# Patient Record
Sex: MALE | Race: WHITE | NOT HISPANIC OR LATINO | Employment: FULL TIME | URBAN - METROPOLITAN AREA
[De-identification: names, ages, dates, MRNs, and addresses within clinical notes are randomized per-mention and may not be internally consistent; named-entity substitution may affect disease eponyms.]

---
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
## 2018-05-29 ENCOUNTER — OFFICE VISIT (OUTPATIENT)
Dept: INTERNAL MEDICINE | Facility: CLINIC | Age: 18
End: 2018-05-29
Payer: COMMERCIAL

## 2018-05-29 ENCOUNTER — LAB VISIT (OUTPATIENT)
Dept: LAB | Facility: HOSPITAL | Age: 18
End: 2018-05-29
Payer: COMMERCIAL

## 2018-05-29 VITALS
TEMPERATURE: 98 F | BODY MASS INDEX: 20.83 KG/M2 | HEIGHT: 70 IN | WEIGHT: 145.5 LBS | SYSTOLIC BLOOD PRESSURE: 118 MMHG | OXYGEN SATURATION: 97 % | HEART RATE: 93 BPM | DIASTOLIC BLOOD PRESSURE: 68 MMHG

## 2018-05-29 DIAGNOSIS — Z11.3 SCREEN FOR STD (SEXUALLY TRANSMITTED DISEASE): ICD-10-CM

## 2018-05-29 DIAGNOSIS — Z11.3 SCREEN FOR STD (SEXUALLY TRANSMITTED DISEASE): Primary | ICD-10-CM

## 2018-05-29 PROCEDURE — 86592 SYPHILIS TEST NON-TREP QUAL: CPT

## 2018-05-29 PROCEDURE — 86703 HIV-1/HIV-2 1 RESULT ANTBDY: CPT

## 2018-05-29 PROCEDURE — 3008F BODY MASS INDEX DOCD: CPT | Mod: CPTII,S$GLB,, | Performed by: NURSE PRACTITIONER

## 2018-05-29 PROCEDURE — 87491 CHLMYD TRACH DNA AMP PROBE: CPT

## 2018-05-29 PROCEDURE — 86694 HERPES SIMPLEX NES ANTBDY: CPT

## 2018-05-29 PROCEDURE — 99213 OFFICE O/P EST LOW 20 MIN: CPT | Mod: S$GLB,,, | Performed by: NURSE PRACTITIONER

## 2018-05-29 PROCEDURE — 80074 ACUTE HEPATITIS PANEL: CPT

## 2018-05-29 PROCEDURE — 36415 COLL VENOUS BLD VENIPUNCTURE: CPT

## 2018-05-29 PROCEDURE — 99999 PR PBB SHADOW E&M-EST. PATIENT-LVL III: CPT | Mod: PBBFAC,,, | Performed by: NURSE PRACTITIONER

## 2018-05-29 NOTE — PROGRESS NOTES
"Subjective:       Patient ID: Fili Dejesus is a 18 y.o. male.    Chief Complaint: Exposure to STD (Check)    HPI;  17 yo male that presents to clinic today for STD screen.    Patient denies any acute symptoms or complaints.  States that he just wants to "be safe and get checked."  Denies any pain with urination, discharge or any lumps or bums in genital region.  States that he does wear condoms when engaging in sexual activity.  He denies any exposure to someone with known STD.    Review of Systems   Constitutional: Negative for activity change, appetite change, fatigue and fever.   Respiratory: Negative for apnea, cough, shortness of breath and wheezing.    Cardiovascular: Negative for chest pain, palpitations and leg swelling.   Gastrointestinal: Negative for abdominal distention, abdominal pain, constipation, diarrhea, nausea and vomiting.   Genitourinary: Negative for difficulty urinating, discharge, dysuria, frequency, genital sores, hematuria, penile pain, penile swelling, scrotal swelling, testicular pain and urgency.   Musculoskeletal: Negative for arthralgias, myalgias, neck pain and neck stiffness.   Skin: Negative for color change and rash.   Neurological: Negative for dizziness, light-headedness, numbness and headaches.   Psychiatric/Behavioral: Negative for behavioral problems.       Objective:      Physical Exam   Constitutional: He is oriented to person, place, and time. He appears well-developed and well-nourished. No distress.   Neurological: He is alert and oriented to person, place, and time. No cranial nerve deficit or sensory deficit.   Skin: Skin is warm and dry. No erythema.   Psychiatric: His behavior is normal.       Assessment:       1. Screen for STD (sexually transmitted disease)        Plan:       1. Screen for STD (sexually transmitted disease)    -Vitals are stable.  -No acute symptoms or complaints.  -Wears protection when engaging in sexual activity.  -Will test for HIV, herpes, " syphilis, Hep ABC and gonorrhea and chlamydia.

## 2018-05-30 LAB
C TRACH DNA SPEC QL NAA+PROBE: NOT DETECTED
HAV IGM SERPL QL IA: NEGATIVE
HBV CORE IGM SERPL QL IA: NEGATIVE
HBV SURFACE AG SERPL QL IA: NEGATIVE
HCV AB SERPL QL IA: NEGATIVE
HIV 1+2 AB+HIV1 P24 AG SERPL QL IA: NEGATIVE
HSV AB, IGM BY EIA: NEGATIVE
N GONORRHOEA DNA SPEC QL NAA+PROBE: NOT DETECTED
RPR SER QL: NORMAL

## 2020-06-19 ENCOUNTER — PATIENT MESSAGE (OUTPATIENT)
Dept: INTERNAL MEDICINE | Facility: CLINIC | Age: 20
End: 2020-06-19

## 2020-06-21 ENCOUNTER — HOSPITAL ENCOUNTER (EMERGENCY)
Facility: HOSPITAL | Age: 20
Discharge: HOME | End: 2020-06-21
Attending: EMERGENCY MEDICINE
Payer: COMMERCIAL

## 2020-06-21 VITALS
SYSTOLIC BLOOD PRESSURE: 123 MMHG | DIASTOLIC BLOOD PRESSURE: 71 MMHG | RESPIRATION RATE: 20 BRPM | OXYGEN SATURATION: 98 % | BODY MASS INDEX: 23.41 KG/M2 | TEMPERATURE: 98 F | WEIGHT: 167.25 LBS | HEIGHT: 71 IN | HEART RATE: 79 BPM

## 2020-06-21 DIAGNOSIS — Z20.822 EXPOSURE TO COVID-19 VIRUS: Primary | ICD-10-CM

## 2020-06-21 LAB — SARS-COV-2 RDRP RESP QL NAA+PROBE: NEGATIVE

## 2020-06-21 PROCEDURE — U0002 COVID-19 LAB TEST NON-CDC: HCPCS

## 2020-06-21 PROCEDURE — 99283 EMERGENCY DEPT VISIT LOW MDM: CPT

## 2020-06-21 NOTE — ED PROVIDER NOTES
"   History      Chief Complaint   Patient presents with    COVID-19 Concerns     Pt states, "I work at [employer] and I need to get tested for Covid-19."       Review of patient's allergies indicates:  No Known Allergies     HPI   HPI    6/21/2020, 2:07 PM   History obtained from the patient      History of Present Illness: Fili Dejesus is a 20 y.o. male patient who presents to the Emergency Department for covid 19 swab.  No symptoms but several close positive contacts.  Symptoms are moderate in severity.     No further complaints or concerns at this time.           PCP: Primary Doctor No       Past Medical History:  No past medical history on file.      Past Surgical History:  Past Surgical History:   Procedure Laterality Date    ADENOIDECTOMY      FRACTURE SURGERY      left arm ORIF    HERNIA REPAIR      inguinal repair at 10 months of age    TONSILLECTOMY             Family History:  Family History   Problem Relation Age of Onset    No Known Problems Mother     No Known Problems Father            Social History:  Social History     Tobacco Use    Smoking status: Light Tobacco Smoker    Smokeless tobacco: Never Used   Substance and Sexual Activity    Alcohol use: Yes     Comment: socially    Drug use: No    Sexual activity: Yes     Partners: Female     Birth control/protection: Condom       ROS     Review of Systems   Constitutional: Negative for chills and fever.   HENT: Negative for facial swelling and trouble swallowing.    Eyes: Negative for pain and discharge.   Respiratory: Negative for chest tightness and shortness of breath.    Cardiovascular: Negative for palpitations and leg swelling.   Gastrointestinal: Negative for diarrhea and vomiting.   Endocrine: Negative for polydipsia and polyuria.   Genitourinary: Negative for decreased urine volume and flank pain.   Musculoskeletal: Negative for joint swelling and neck stiffness.   Skin: Negative for rash and wound.   Neurological: Negative for " "syncope and light-headedness.   All other systems reviewed and are negative.      Physical Exam      Initial Vitals [06/21/20 1250]   BP Pulse Resp Temp SpO2   123/71 79 20 97.9 °F (36.6 °C) 98 %      MAP       --         Physical Exam  Vital signs and nursing notes reviewed.  Constitutional: Patient is in NAD. Awake and alert. Well-developed and well-nourished.  Head: Atraumatic. Normocephalic.  Eyes: PERRL. EOM intact. Conjunctivae nl. No scleral icterus.  ENT: Mucous membranes are moist. Oropharynx is clear.  Neck: Supple. No JVD. No lymphadenopathy.  No meningismus  Cardiovascular: Regular rate and rhythm. No murmurs, rubs, or gallops. Distal pulses are 2+ and symmetric.  Pulmonary/Chest: No respiratory distress. Clear to auscultation bilaterally. No wheezing, rales, or rhonchi.  Abdominal: Soft. Non-distended. No TTP. No rebound, guarding, or rigidity. Good bowel sounds.  Genitourinary: No CVA tenderness  Musculoskeletal: Moves all extremities. No edema.   Skin: Warm and dry.  Neurological: Awake and alert. No acute focal neurological deficits are appreciated.  Psychiatric: Normal affect. Good eye contact. Appropriate in content.      ED Course          Procedures  ED Vital Signs:  Vitals:    06/21/20 1250   BP: 123/71   Pulse: 79   Resp: 20   Temp: 97.9 °F (36.6 °C)   TempSrc: Oral   SpO2: 98%   Weight: 75.8 kg (167 lb 3.5 oz)   Height: 5' 11" (1.803 m)         Results for orders placed or performed during the hospital encounter of 06/21/20   COVID-19 Rapid Screening   Result Value Ref Range    SARS-CoV-2 RNA, Amplification, Qual Negative Negative             Imaging Results:  Imaging Results    None            The Emergency Provider reviewed the vital signs and test results, which are outlined above.    ED Discussion             Medication(s) given in the ER:  Medications - No data to display        Follow-up Information     [facility]    Specialty: Emergency Medicine  Why: If symptoms " worsen  Contact information:  [address] [facility] [address] [phone]                        Medication List      You have not been prescribed any medications.             Medical Decision Making      GAVE COVID 19 PRECAUTIONS.  RETURN TO ER IF DIFFICULTY BREATHING, UNABLE TO KEEP DOWN FLUIDS, ETC    All findings were reviewed with the patient/family in detail.   All remaining questions and concerns were addressed at that time.  Patient/family has been counseled regarding the need for follow-up as well as the indication to return to the emergency room should new or worrisome developments occur.        MDM               Clinical Impression:        ICD-10-CM ICD-9-CM   1. Exposure to Covid-19 Virus  Z20.828              Glo Frank PA-C  06/21/20 1403

## 2020-06-28 ENCOUNTER — CLINICAL SUPPORT (OUTPATIENT)
Dept: URGENT CARE | Facility: CLINIC | Age: 20
End: 2020-06-28
Payer: COMMERCIAL

## 2020-06-28 VITALS — RESPIRATION RATE: 16 BRPM | HEART RATE: 76 BPM | OXYGEN SATURATION: 98 % | TEMPERATURE: 99 F

## 2020-06-28 DIAGNOSIS — Z03.818 ENCOUNTER FOR OBSERVATION FOR SUSPECTED EXPOSURE TO OTHER BIOLOGICAL AGENTS RULED OUT: ICD-10-CM

## 2020-06-28 PROCEDURE — 99211 PR OFFICE/OUTPT VISIT, EST, LEVL I: ICD-10-PCS | Mod: S$GLB,,, | Performed by: NURSE PRACTITIONER

## 2020-06-28 PROCEDURE — 99211 OFF/OP EST MAY X REQ PHY/QHP: CPT | Mod: S$GLB,,, | Performed by: NURSE PRACTITIONER

## 2020-06-28 PROCEDURE — U0003 INFECTIOUS AGENT DETECTION BY NUCLEIC ACID (DNA OR RNA); SEVERE ACUTE RESPIRATORY SYNDROME CORONAVIRUS 2 (SARS-COV-2) (CORONAVIRUS DISEASE [COVID-19]), AMPLIFIED PROBE TECHNIQUE, MAKING USE OF HIGH THROUGHPUT TECHNOLOGIES AS DESCRIBED BY CMS-2020-01-R: HCPCS

## 2020-07-02 LAB — SARS-COV-2 RNA RESP QL NAA+PROBE: NOT DETECTED

## 2020-07-03 ENCOUNTER — TELEPHONE (OUTPATIENT)
Dept: URGENT CARE | Facility: CLINIC | Age: 20
End: 2020-07-03

## 2020-12-30 ENCOUNTER — OFFICE VISIT (OUTPATIENT)
Dept: URGENT CARE | Facility: CLINIC | Age: 20
End: 2020-12-30
Payer: COMMERCIAL

## 2020-12-30 VITALS
DIASTOLIC BLOOD PRESSURE: 78 MMHG | HEART RATE: 72 BPM | TEMPERATURE: 99 F | BODY MASS INDEX: 23.62 KG/M2 | RESPIRATION RATE: 17 BRPM | OXYGEN SATURATION: 98 % | SYSTOLIC BLOOD PRESSURE: 124 MMHG | HEIGHT: 70 IN | WEIGHT: 165 LBS

## 2020-12-30 DIAGNOSIS — U07.1 COVID-19 VIRUS DETECTED: ICD-10-CM

## 2020-12-30 DIAGNOSIS — U07.1 COVID-19 VIRUS INFECTION: Primary | ICD-10-CM

## 2020-12-30 LAB
CTP QC/QA: YES
SARS-COV-2 RDRP RESP QL NAA+PROBE: POSITIVE

## 2020-12-30 PROCEDURE — U0002 COVID-19 LAB TEST NON-CDC: HCPCS | Mod: QW,S$GLB,, | Performed by: STUDENT IN AN ORGANIZED HEALTH CARE EDUCATION/TRAINING PROGRAM

## 2020-12-30 PROCEDURE — U0002: ICD-10-PCS | Mod: QW,S$GLB,, | Performed by: STUDENT IN AN ORGANIZED HEALTH CARE EDUCATION/TRAINING PROGRAM

## 2020-12-30 PROCEDURE — 99212 OFFICE O/P EST SF 10 MIN: CPT | Mod: S$GLB,,, | Performed by: STUDENT IN AN ORGANIZED HEALTH CARE EDUCATION/TRAINING PROGRAM

## 2020-12-30 PROCEDURE — 99212 PR OFFICE/OUTPT VISIT, EST, LEVL II, 10-19 MIN: ICD-10-PCS | Mod: S$GLB,,, | Performed by: STUDENT IN AN ORGANIZED HEALTH CARE EDUCATION/TRAINING PROGRAM

## 2020-12-30 PROCEDURE — 3008F BODY MASS INDEX DOCD: CPT | Mod: CPTII,S$GLB,, | Performed by: STUDENT IN AN ORGANIZED HEALTH CARE EDUCATION/TRAINING PROGRAM

## 2020-12-30 PROCEDURE — 3008F PR BODY MASS INDEX (BMI) DOCUMENTED: ICD-10-PCS | Mod: CPTII,S$GLB,, | Performed by: STUDENT IN AN ORGANIZED HEALTH CARE EDUCATION/TRAINING PROGRAM

## 2020-12-30 NOTE — PROGRESS NOTES
"Subjective:       Patient ID: Fili Dejesus is a 20 y.o. male.    Vitals:  height is 5' 10" (1.778 m) and weight is 74.8 kg (165 lb). His temperature is 98.7 °F (37.1 °C). His blood pressure is 124/78 and his pulse is 72. His respiration is 17 and oxygen saturation is 98%.     Chief Complaint: COVID-19 Concerns    Pt states he have a slight cough and body aches that started yesterday.     Other  This is a new problem. The current episode started yesterday. The problem occurs constantly. The problem has been unchanged. Associated symptoms include coughing. Pertinent negatives include no chills, congestion, diaphoresis, fatigue, fever, myalgias, nausea, rash, sore throat or vomiting. Nothing aggravates the symptoms.   No known COVID exposure.    Constitution: Negative for chills, sweating, fatigue and fever.   HENT: Negative for ear pain, congestion, sinus pain, sinus pressure, sore throat and voice change.    Neck: Negative for painful lymph nodes.   Eyes: Negative for eye redness.   Respiratory: Positive for cough. Negative for chest tightness, sputum production, bloody sputum, COPD, shortness of breath, stridor, wheezing and asthma.    Gastrointestinal: Negative for nausea and vomiting.   Musculoskeletal: Negative for muscle ache.   Skin: Negative for rash.   Allergic/Immunologic: Negative for seasonal allergies and asthma.   Hematologic/Lymphatic: Negative for swollen lymph nodes.       Objective:      Physical Exam   Constitutional: He is oriented to person, place, and time.  Non-toxic appearance. He does not appear ill. No distress.   HENT:   Head: Normocephalic.   Eyes: Conjunctivae are normal.   Pulmonary/Chest: Effort normal. No respiratory distress.   Abdominal: Normal appearance.   Neurological: He is alert and oriented to person, place, and time. Coordination and gait normal.   Skin: Skin is not diaphoretic. Psychiatric: His behavior is normal. Mood and thought content normal.   Nursing note and vitals " reviewed.        Assessment:       1. Cough        Plan:         Cough  -     POCT COVID-19 Rapid Screening        Telephone interview conducted in urgent care today. Exam denotes features of patient observation only.   Vitals stable. No evidence of respiratory distress noted, able to speak in complete sentences without pause.    Requesting COVID-19 testing given symptoms.   Tested for COVID-19 today. Rapid test was Positive. Educated on COVID-19 and COVID-19 testing. Advised on COVID-19 precautions. Discussed supportive care and OTC meds for symptom relief. Advised on return/follow-up precautions. Advised on ER precautions. Answered all patient questions. Patient verbalized understanding and voiced agreement with current treatment plan.

## 2021-10-01 ENCOUNTER — LAB VISIT (OUTPATIENT)
Dept: LAB | Facility: HOSPITAL | Age: 21
End: 2021-10-01
Payer: COMMERCIAL

## 2021-10-01 ENCOUNTER — IMMUNIZATION (OUTPATIENT)
Dept: INTERNAL MEDICINE | Facility: CLINIC | Age: 21
End: 2021-10-01
Payer: COMMERCIAL

## 2021-10-01 ENCOUNTER — OFFICE VISIT (OUTPATIENT)
Dept: INTERNAL MEDICINE | Facility: CLINIC | Age: 21
End: 2021-10-01
Payer: COMMERCIAL

## 2021-10-01 VITALS
BODY MASS INDEX: 21.62 KG/M2 | HEART RATE: 75 BPM | WEIGHT: 159.63 LBS | OXYGEN SATURATION: 98 % | DIASTOLIC BLOOD PRESSURE: 70 MMHG | SYSTOLIC BLOOD PRESSURE: 126 MMHG | HEIGHT: 72 IN

## 2021-10-01 DIAGNOSIS — Z11.3 SCREEN FOR STD (SEXUALLY TRANSMITTED DISEASE): ICD-10-CM

## 2021-10-01 DIAGNOSIS — Z00.00 VISIT FOR ANNUAL HEALTH EXAMINATION: Primary | ICD-10-CM

## 2021-10-01 DIAGNOSIS — Z23 NEED FOR VACCINATION: ICD-10-CM

## 2021-10-01 DIAGNOSIS — Z00.00 VISIT FOR ANNUAL HEALTH EXAMINATION: ICD-10-CM

## 2021-10-01 LAB
ALBUMIN SERPL BCP-MCNC: 4.5 G/DL (ref 3.5–5.2)
ALP SERPL-CCNC: 64 U/L (ref 55–135)
ALT SERPL W/O P-5'-P-CCNC: 19 U/L (ref 10–44)
ANION GAP SERPL CALC-SCNC: 13 MMOL/L (ref 8–16)
AST SERPL-CCNC: 19 U/L (ref 10–40)
BASOPHILS # BLD AUTO: 0.03 K/UL (ref 0–0.2)
BASOPHILS NFR BLD: 0.7 % (ref 0–1.9)
BILIRUB SERPL-MCNC: 0.8 MG/DL (ref 0.1–1)
BUN SERPL-MCNC: 16 MG/DL (ref 6–20)
CALCIUM SERPL-MCNC: 9.8 MG/DL (ref 8.7–10.5)
CHLORIDE SERPL-SCNC: 105 MMOL/L (ref 95–110)
CO2 SERPL-SCNC: 22 MMOL/L (ref 23–29)
CREAT SERPL-MCNC: 1.1 MG/DL (ref 0.5–1.4)
DIFFERENTIAL METHOD: ABNORMAL
EOSINOPHIL # BLD AUTO: 0 K/UL (ref 0–0.5)
EOSINOPHIL NFR BLD: 0.9 % (ref 0–8)
ERYTHROCYTE [DISTWIDTH] IN BLOOD BY AUTOMATED COUNT: 12.7 % (ref 11.5–14.5)
EST. GFR  (AFRICAN AMERICAN): >60 ML/MIN/1.73 M^2
EST. GFR  (NON AFRICAN AMERICAN): >60 ML/MIN/1.73 M^2
GLUCOSE SERPL-MCNC: 85 MG/DL (ref 70–110)
HCT VFR BLD AUTO: 44.5 % (ref 40–54)
HGB BLD-MCNC: 15.2 G/DL (ref 14–18)
IMM GRANULOCYTES # BLD AUTO: 0.01 K/UL (ref 0–0.04)
IMM GRANULOCYTES NFR BLD AUTO: 0.2 % (ref 0–0.5)
LYMPHOCYTES # BLD AUTO: 1.3 K/UL (ref 1–4.8)
LYMPHOCYTES NFR BLD: 29.2 % (ref 18–48)
MCH RBC QN AUTO: 32.1 PG (ref 27–31)
MCHC RBC AUTO-ENTMCNC: 34.2 G/DL (ref 32–36)
MCV RBC AUTO: 94 FL (ref 82–98)
MONOCYTES # BLD AUTO: 0.4 K/UL (ref 0.3–1)
MONOCYTES NFR BLD: 10.2 % (ref 4–15)
NEUTROPHILS # BLD AUTO: 2.5 K/UL (ref 1.8–7.7)
NEUTROPHILS NFR BLD: 58.8 % (ref 38–73)
NRBC BLD-RTO: 0 /100 WBC
PLATELET # BLD AUTO: 268 K/UL (ref 150–450)
PMV BLD AUTO: 10.9 FL (ref 9.2–12.9)
POTASSIUM SERPL-SCNC: 4.6 MMOL/L (ref 3.5–5.1)
PROT SERPL-MCNC: 7.5 G/DL (ref 6–8.4)
RBC # BLD AUTO: 4.73 M/UL (ref 4.6–6.2)
SODIUM SERPL-SCNC: 140 MMOL/L (ref 136–145)
WBC # BLD AUTO: 4.32 K/UL (ref 3.9–12.7)

## 2021-10-01 PROCEDURE — 87591 N.GONORRHOEAE DNA AMP PROB: CPT | Performed by: INTERNAL MEDICINE

## 2021-10-01 PROCEDURE — 80074 ACUTE HEPATITIS PANEL: CPT | Performed by: INTERNAL MEDICINE

## 2021-10-01 PROCEDURE — 99385 PR PREVENTIVE VISIT,NEW,18-39: ICD-10-PCS | Mod: 25,S$GLB,, | Performed by: INTERNAL MEDICINE

## 2021-10-01 PROCEDURE — 90686 IIV4 VACC NO PRSV 0.5 ML IM: CPT | Mod: S$GLB,,, | Performed by: INTERNAL MEDICINE

## 2021-10-01 PROCEDURE — 99999 PR PBB SHADOW E&M-EST. PATIENT-LVL III: CPT | Mod: PBBFAC,,, | Performed by: INTERNAL MEDICINE

## 2021-10-01 PROCEDURE — 1160F RVW MEDS BY RX/DR IN RCRD: CPT | Mod: CPTII,S$GLB,, | Performed by: INTERNAL MEDICINE

## 2021-10-01 PROCEDURE — 3078F DIAST BP <80 MM HG: CPT | Mod: CPTII,S$GLB,, | Performed by: INTERNAL MEDICINE

## 2021-10-01 PROCEDURE — 3008F PR BODY MASS INDEX (BMI) DOCUMENTED: ICD-10-PCS | Mod: CPTII,S$GLB,, | Performed by: INTERNAL MEDICINE

## 2021-10-01 PROCEDURE — 3074F SYST BP LT 130 MM HG: CPT | Mod: CPTII,S$GLB,, | Performed by: INTERNAL MEDICINE

## 2021-10-01 PROCEDURE — 90471 IMMUNIZATION ADMIN: CPT | Mod: S$GLB,,, | Performed by: INTERNAL MEDICINE

## 2021-10-01 PROCEDURE — 80053 COMPREHEN METABOLIC PANEL: CPT | Performed by: INTERNAL MEDICINE

## 2021-10-01 PROCEDURE — 1160F PR REVIEW ALL MEDS BY PRESCRIBER/CLIN PHARMACIST DOCUMENTED: ICD-10-PCS | Mod: CPTII,S$GLB,, | Performed by: INTERNAL MEDICINE

## 2021-10-01 PROCEDURE — 1159F MED LIST DOCD IN RCRD: CPT | Mod: CPTII,S$GLB,, | Performed by: INTERNAL MEDICINE

## 2021-10-01 PROCEDURE — 3008F BODY MASS INDEX DOCD: CPT | Mod: CPTII,S$GLB,, | Performed by: INTERNAL MEDICINE

## 2021-10-01 PROCEDURE — 99999 PR PBB SHADOW E&M-EST. PATIENT-LVL III: ICD-10-PCS | Mod: PBBFAC,,, | Performed by: INTERNAL MEDICINE

## 2021-10-01 PROCEDURE — 86592 SYPHILIS TEST NON-TREP QUAL: CPT | Performed by: INTERNAL MEDICINE

## 2021-10-01 PROCEDURE — 90686 FLU VACCINE (QUAD) GREATER THAN OR EQUAL TO 3YO PRESERVATIVE FREE IM: ICD-10-PCS | Mod: S$GLB,,, | Performed by: INTERNAL MEDICINE

## 2021-10-01 PROCEDURE — 87491 CHLMYD TRACH DNA AMP PROBE: CPT | Performed by: INTERNAL MEDICINE

## 2021-10-01 PROCEDURE — 1159F PR MEDICATION LIST DOCUMENTED IN MEDICAL RECORD: ICD-10-PCS | Mod: CPTII,S$GLB,, | Performed by: INTERNAL MEDICINE

## 2021-10-01 PROCEDURE — 90471 FLU VACCINE (QUAD) GREATER THAN OR EQUAL TO 3YO PRESERVATIVE FREE IM: ICD-10-PCS | Mod: S$GLB,,, | Performed by: INTERNAL MEDICINE

## 2021-10-01 PROCEDURE — 99385 PREV VISIT NEW AGE 18-39: CPT | Mod: 25,S$GLB,, | Performed by: INTERNAL MEDICINE

## 2021-10-01 PROCEDURE — 36415 COLL VENOUS BLD VENIPUNCTURE: CPT | Performed by: INTERNAL MEDICINE

## 2021-10-01 PROCEDURE — 3078F PR MOST RECENT DIASTOLIC BLOOD PRESSURE < 80 MM HG: ICD-10-PCS | Mod: CPTII,S$GLB,, | Performed by: INTERNAL MEDICINE

## 2021-10-01 PROCEDURE — 85025 COMPLETE CBC W/AUTO DIFF WBC: CPT | Performed by: INTERNAL MEDICINE

## 2021-10-01 PROCEDURE — 87389 HIV-1 AG W/HIV-1&-2 AB AG IA: CPT | Performed by: INTERNAL MEDICINE

## 2021-10-01 PROCEDURE — 3074F PR MOST RECENT SYSTOLIC BLOOD PRESSURE < 130 MM HG: ICD-10-PCS | Mod: CPTII,S$GLB,, | Performed by: INTERNAL MEDICINE

## 2021-10-02 LAB
C TRACH DNA SPEC QL NAA+PROBE: NOT DETECTED
N GONORRHOEA DNA SPEC QL NAA+PROBE: NOT DETECTED
RPR SER QL: NORMAL

## 2021-10-04 LAB
HAV IGM SERPL QL IA: NEGATIVE
HBV CORE IGM SERPL QL IA: NEGATIVE
HBV SURFACE AG SERPL QL IA: NEGATIVE
HCV AB SERPL QL IA: NEGATIVE
HIV 1+2 AB+HIV1 P24 AG SERPL QL IA: NEGATIVE

## 2022-03-29 ENCOUNTER — PATIENT MESSAGE (OUTPATIENT)
Dept: RESEARCH | Facility: HOSPITAL | Age: 22
End: 2022-03-29
Payer: COMMERCIAL

## 2022-04-12 ENCOUNTER — TELEPHONE (OUTPATIENT)
Dept: INTERNAL MEDICINE | Facility: CLINIC | Age: 22
End: 2022-04-12
Payer: COMMERCIAL

## 2022-04-12 NOTE — TELEPHONE ENCOUNTER
Called pt, left voice message replying to his appointment request with Dr Thomas for as soon as possible due  To being sick. Stated in message that Dr Thomas has no available appointments but to please call us back to make an urgent care visit asap for this issue. Left office call back number

## 2022-08-03 ENCOUNTER — OCCUPATIONAL HEALTH (OUTPATIENT)
Dept: URGENT CARE | Facility: CLINIC | Age: 22
End: 2022-08-03

## 2022-08-03 DIAGNOSIS — Z02.1 PRE-EMPLOYMENT EXAMINATION: Primary | ICD-10-CM

## 2022-08-03 PROCEDURE — 99499 PHYSICAL, BASIC COMPLEXITY: ICD-10-PCS | Mod: S$GLB,,, | Performed by: EMERGENCY MEDICINE

## 2022-08-03 PROCEDURE — 99499 UNLISTED E&M SERVICE: CPT | Mod: S$GLB,,, | Performed by: EMERGENCY MEDICINE

## 2023-01-17 ENCOUNTER — OFFICE VISIT (OUTPATIENT)
Dept: INTERNAL MEDICINE | Facility: CLINIC | Age: 23
End: 2023-01-17
Payer: COMMERCIAL

## 2023-01-17 VITALS
BODY MASS INDEX: 23.43 KG/M2 | SYSTOLIC BLOOD PRESSURE: 124 MMHG | DIASTOLIC BLOOD PRESSURE: 78 MMHG | HEART RATE: 67 BPM | WEIGHT: 173 LBS | HEIGHT: 72 IN | TEMPERATURE: 98 F | OXYGEN SATURATION: 98 %

## 2023-01-17 DIAGNOSIS — J02.9 SORE THROAT: Primary | ICD-10-CM

## 2023-01-17 LAB
CTP QC/QA: YES
MOLECULAR STREP A: NEGATIVE

## 2023-01-17 PROCEDURE — 99213 OFFICE O/P EST LOW 20 MIN: CPT | Mod: S$GLB,,, | Performed by: FAMILY MEDICINE

## 2023-01-17 PROCEDURE — 3074F SYST BP LT 130 MM HG: CPT | Mod: CPTII,S$GLB,, | Performed by: FAMILY MEDICINE

## 2023-01-17 PROCEDURE — 3008F PR BODY MASS INDEX (BMI) DOCUMENTED: ICD-10-PCS | Mod: CPTII,S$GLB,, | Performed by: FAMILY MEDICINE

## 2023-01-17 PROCEDURE — 1159F PR MEDICATION LIST DOCUMENTED IN MEDICAL RECORD: ICD-10-PCS | Mod: CPTII,S$GLB,, | Performed by: FAMILY MEDICINE

## 2023-01-17 PROCEDURE — 87651 POCT STREP A MOLECULAR: ICD-10-PCS | Mod: QW,S$GLB,, | Performed by: FAMILY MEDICINE

## 2023-01-17 PROCEDURE — 87651 STREP A DNA AMP PROBE: CPT | Mod: QW,S$GLB,, | Performed by: FAMILY MEDICINE

## 2023-01-17 PROCEDURE — 99999 PR PBB SHADOW E&M-EST. PATIENT-LVL III: ICD-10-PCS | Mod: PBBFAC,,, | Performed by: FAMILY MEDICINE

## 2023-01-17 PROCEDURE — 3008F BODY MASS INDEX DOCD: CPT | Mod: CPTII,S$GLB,, | Performed by: FAMILY MEDICINE

## 2023-01-17 PROCEDURE — 99999 PR PBB SHADOW E&M-EST. PATIENT-LVL III: CPT | Mod: PBBFAC,,, | Performed by: FAMILY MEDICINE

## 2023-01-17 PROCEDURE — 99213 PR OFFICE/OUTPT VISIT, EST, LEVL III, 20-29 MIN: ICD-10-PCS | Mod: S$GLB,,, | Performed by: FAMILY MEDICINE

## 2023-01-17 PROCEDURE — 3078F DIAST BP <80 MM HG: CPT | Mod: CPTII,S$GLB,, | Performed by: FAMILY MEDICINE

## 2023-01-17 PROCEDURE — 1159F MED LIST DOCD IN RCRD: CPT | Mod: CPTII,S$GLB,, | Performed by: FAMILY MEDICINE

## 2023-01-17 PROCEDURE — 3078F PR MOST RECENT DIASTOLIC BLOOD PRESSURE < 80 MM HG: ICD-10-PCS | Mod: CPTII,S$GLB,, | Performed by: FAMILY MEDICINE

## 2023-01-17 PROCEDURE — 3074F PR MOST RECENT SYSTOLIC BLOOD PRESSURE < 130 MM HG: ICD-10-PCS | Mod: CPTII,S$GLB,, | Performed by: FAMILY MEDICINE

## 2023-01-17 NOTE — PROGRESS NOTES
Subjective:       Patient ID: Fili Dejesus is a 22 y.o. male. PCP Sheri Thomas MD     Chief Complaint: Sore Throat    Patient is here for  visit for sore throat x 1 day. NO other complaints, otherwise feels well  No kids in house  We discussed that we'll do a strep test r/o strep.  If positive jenkins end in abx to his dena deric  If negative, then viral URI and not Rx needed,. OTC prn and hydration  Colds and most upper respiratory infections are viruses that do not respond to antibiotics, and the main treatment is symptomatic care, e.g., Robitussin DM OTC for cough, lots of fluids and rest, etc.    He says he understands. All questions answered      Review of patient's allergies indicates:  No Known Allergies    Social History     Tobacco Use    Smoking status: Some Days    Smokeless tobacco: Never   Substance Use Topics    Alcohol use: Yes     Comment: socially    Drug use: Yes     Types: Marijuana     Comment: few times weekly        Family History   Problem Relation Age of Onset    No Known Problems Mother     No Known Problems Father     Prostate cancer Paternal Grandfather        Past Surgical History:   Procedure Laterality Date    ADENOIDECTOMY      FRACTURE SURGERY      left arm ORIF    HERNIA REPAIR      inguinal repair at 10 months of age    TONSILLECTOMY         There is no problem list on file for this patient.      No current outpatient medications on file prior to visit.     No current facility-administered medications on file prior to visit.           Review of Systems   Constitutional:  Negative for chills and fever.   HENT:  Negative for ear pain.    Eyes:  Negative for pain.   Respiratory:  Negative for chest tightness.    Cardiovascular:  Negative for chest pain.   Gastrointestinal:  Negative for abdominal pain.   Genitourinary:  Negative for flank pain.   Musculoskeletal:  Negative for gait problem.   Neurological:  Negative for syncope.   Psychiatric/Behavioral:  Negative for  behavioral problems.      Objective:    /78 (BP Location: Left arm, Patient Position: Sitting, BP Method: Medium (Manual))   Pulse 67   Temp 98.1 °F (36.7 °C)   Ht 6' (1.829 m)   Wt 78.5 kg (173 lb)   SpO2 98%   BMI 23.46 kg/m²     Physical Exam  Constitutional:       Appearance: He is well-developed.   HENT:      Head: Normocephalic and atraumatic.      Mouth/Throat:      Mouth: Mucous membranes are moist.      Pharynx: Oropharynx is clear. No oropharyngeal exudate or posterior oropharyngeal erythema.   Cardiovascular:      Rate and Rhythm: Normal rate.      Heart sounds: Normal heart sounds.   Pulmonary:      Effort: No respiratory distress.      Breath sounds: Normal breath sounds. No wheezing or rales.   Abdominal:      Palpations: Abdomen is soft.   Musculoskeletal:      Cervical back: Neck supple.   Skin:     General: Skin is dry.   Neurological:      Mental Status: He is alert.   Psychiatric:         Behavior: Behavior normal.       Assessment:       1. Sore throat        Plan:       Fili was seen today for sore throat.    Diagnoses and all orders for this visit:    Sore throat  -     POCT Strep A, Molecular  If positive jenkins end in abx to his dena deric - Amoxil TID x 10d  If negative, then viral URI and not Rx needed,. OTC prn and hydration  Colds and most upper respiratory infections are viruses that do not respond to antibiotics, and the main treatment is symptomatic care, e.g., Robitussin DM OTC for cough, lots of fluids and rest, etc.    Cc: PCP

## 2023-07-11 ENCOUNTER — CLINICAL SUPPORT (OUTPATIENT)
Dept: OTHER | Facility: CLINIC | Age: 23
End: 2023-07-11
Payer: COMMERCIAL

## 2023-07-11 DIAGNOSIS — Z00.8 ENCOUNTER FOR OTHER GENERAL EXAMINATION: ICD-10-CM

## 2023-07-12 VITALS
DIASTOLIC BLOOD PRESSURE: 78 MMHG | WEIGHT: 171 LBS | HEIGHT: 71 IN | BODY MASS INDEX: 23.94 KG/M2 | SYSTOLIC BLOOD PRESSURE: 114 MMHG

## 2023-07-12 LAB
HDLC SERPL-MCNC: 55 MG/DL
POC CHOLESTEROL, LDL (DOCK): 100 MG/DL
POC CHOLESTEROL, TOTAL: 173 MG/DL
POC GLUCOSE, FASTING: 83 MG/DL (ref 60–110)
TRIGL SERPL-MCNC: 102 MG/DL

## 2023-11-10 ENCOUNTER — OCCUPATIONAL HEALTH (OUTPATIENT)
Dept: URGENT CARE | Facility: CLINIC | Age: 23
End: 2023-11-10
Payer: COMMERCIAL

## 2023-11-10 DIAGNOSIS — Z00.00 ENCOUNTER FOR PHYSICAL EXAMINATION: Primary | ICD-10-CM

## 2023-11-10 PROCEDURE — 99499 UNLISTED E&M SERVICE: CPT | Mod: S$GLB,,, | Performed by: EMERGENCY MEDICINE

## 2023-11-10 PROCEDURE — 99499 DOT PHYSICAL: ICD-10-PCS | Mod: S$GLB,,, | Performed by: EMERGENCY MEDICINE

## 2024-01-03 ENCOUNTER — OFFICE VISIT (OUTPATIENT)
Dept: INTERNAL MEDICINE | Facility: CLINIC | Age: 24
End: 2024-01-03
Payer: COMMERCIAL

## 2024-01-03 VITALS
BODY MASS INDEX: 25.74 KG/M2 | DIASTOLIC BLOOD PRESSURE: 72 MMHG | SYSTOLIC BLOOD PRESSURE: 118 MMHG | WEIGHT: 183.88 LBS | HEIGHT: 71 IN | OXYGEN SATURATION: 98 % | HEART RATE: 105 BPM

## 2024-01-03 DIAGNOSIS — J10.1 INFLUENZA A: Primary | ICD-10-CM

## 2024-01-03 DIAGNOSIS — Z23 NEED FOR VACCINATION: ICD-10-CM

## 2024-01-03 DIAGNOSIS — J02.9 SORE THROAT: ICD-10-CM

## 2024-01-03 PROCEDURE — 3078F DIAST BP <80 MM HG: CPT | Mod: CPTII,S$GLB,, | Performed by: INTERNAL MEDICINE

## 2024-01-03 PROCEDURE — 99214 OFFICE O/P EST MOD 30 MIN: CPT | Mod: S$GLB,,, | Performed by: INTERNAL MEDICINE

## 2024-01-03 PROCEDURE — 1159F MED LIST DOCD IN RCRD: CPT | Mod: CPTII,S$GLB,, | Performed by: INTERNAL MEDICINE

## 2024-01-03 PROCEDURE — 3008F BODY MASS INDEX DOCD: CPT | Mod: CPTII,S$GLB,, | Performed by: INTERNAL MEDICINE

## 2024-01-03 PROCEDURE — 3074F SYST BP LT 130 MM HG: CPT | Mod: CPTII,S$GLB,, | Performed by: INTERNAL MEDICINE

## 2024-01-03 PROCEDURE — 99999 PR PBB SHADOW E&M-EST. PATIENT-LVL III: CPT | Mod: PBBFAC,,, | Performed by: INTERNAL MEDICINE

## 2024-01-03 RX ORDER — DEXAMETHASONE 4 MG/1
4 TABLET ORAL EVERY 12 HOURS
Qty: 6 TABLET | Refills: 0 | Status: SHIPPED | OUTPATIENT
Start: 2024-01-03 | End: 2024-01-06

## 2024-01-03 RX ORDER — OSELTAMIVIR PHOSPHATE 75 MG/1
75 CAPSULE ORAL 2 TIMES DAILY
Qty: 10 CAPSULE | Refills: 0 | Status: SHIPPED | OUTPATIENT
Start: 2024-01-03 | End: 2024-01-08

## 2024-01-03 RX ORDER — TETANUS TOXOID, REDUCED DIPHTHERIA TOXOID AND ACELLULAR PERTUSSIS VACCINE, ADSORBED 5; 2.5; 8; 8; 2.5 [IU]/.5ML; [IU]/.5ML; UG/.5ML; UG/.5ML; UG/.5ML
0.5 SUSPENSION INTRAMUSCULAR ONCE
Qty: 0.5 ML | Refills: 0 | Status: SHIPPED | OUTPATIENT
Start: 2024-01-15 | End: 2024-01-15

## 2024-01-03 NOTE — PROGRESS NOTES
INTERNAL MEDICINE CLINIC  Follow-up Visit Progress Note     PRESENTING HISTORY     PCP: Panfilo Brar MD  Current Chief Complaint/Problem:    Chief Complaint   Patient presents with    Sore Throat     Also have headache and chest pains     History of Present Illness & ROS: Mr. Fili Dejesus is a 23 y.o. male.    Started with sore throat last night.    Flew back from [place] on Monday.    No chill.    Pleuritic chest pain with mucous.    Frontal headache.    Took cough syrup OTC and lozenges.    PAST HISTORY:     Past Medical History:   Diagnosis Date    History of COVID-19 2020       Past Surgical History:   Procedure Laterality Date    ADENOIDECTOMY      FRACTURE SURGERY      left arm ORIF Age 12    HERNIA REPAIR      inguinal repair at 10 months of age    TONSILLECTOMY         Family History   Problem Relation Age of Onset    No Known Problems Mother     No Known Problems Father     Prostate cancer Paternal Grandfather        Social History     Socioeconomic History    Marital status: Single   Occupational History     Comment: student at    Tobacco Use    Smoking status: Never    Smokeless tobacco: Never   Substance and Sexual Activity    Alcohol use: Yes     Comment: socially    Drug use: Yes     Types: Marijuana     Comment: few times weekly     Sexual activity: Yes     Partners: Female     Birth control/protection: Condom   Social History Narrative    He works in [employer].    Single        Goes to gym - weightlifting, cardio       MEDICATIONS & ALLERGIES:     No current outpatient medications on file prior to visit.     No current facility-administered medications on file prior to visit.        Review of patient's allergies indicates:  No Known Allergies    Medications Reconciliation:   I have reconciled the patient's home medications and discharge medications with the patient/family. I have updated all changes.  Refer to After-Visit Medication List.    OBJECTIVE:     Vital Signs:  Vitals:     01/03/24 1019   BP: 118/72   Pulse: 105     Wt Readings from Last 3 Encounters:   01/03/24 1019 83.4 kg (183 lb 13.8 oz)   07/11/23 0826 77.6 kg (171 lb)   01/17/23 0933 78.5 kg (173 lb)     Body mass index is 25.64 kg/m².     98% RA. Temp 100.5F    Physical Exam:  General: Well developed, well nourished. No distress.  HEENT: Head is normocephalic, atraumatic; ears are normal.   Pharynx slightly erythematous.   Eyes: Clear conjunctiva.  Neck: Supple, symmetrical neck; trachea midline.  Lungs: Clear to auscultation bilaterally and normal respiratory effort. Slight decrease in breath sounds.  Mildly tender on palpation anterior ribs near sternum.  Cardiovascular: Heart with regular rhythm.  Slightly tachy.  Extremities: No LE edema. Pulses 2+ and symmetric.   Abdomen: Abdomen is soft, non-tender non-distended with normal bowel sounds.  Skin: Skin color, texture, turgor normal. No rashes.  Musculoskeletal: Normal gait.   Lymph Nodes: No cervical or supraclavicular adenopathy.  Neurologic: Normal strength and tone. No focal numbness or weakness.   Psychiatric: Normal affect. Alert.      Laboratory  Lab Results   Component Value Date    WBC 4.32 10/01/2021    HGB 15.2 10/01/2021    HCT 44.5 10/01/2021     10/01/2021    CHOL 175 08/07/2017    TRIG 117 08/07/2017    HDL 76 (H) 08/07/2017    ALT 19 10/01/2021    AST 19 10/01/2021     10/01/2021    K 4.6 10/01/2021     10/01/2021    CREATININE 1.1 10/01/2021    BUN 16 10/01/2021    CO2 22 (L) 10/01/2021       ASSESSMENT & PLAN:     Influenza A  Sore throat  -     POCT COVID-19 Rapid Screening: negative  -     POCT Strep A, Molecular: negative  -     POCT Influenza A/B Molecular: positive    Plan:  -     oseltamivir (TAMIFLU) 75 MG capsule; Take 1 capsule (75 mg total) by mouth 2 (two) times daily. for 5 days  Dispense: 10 capsule; Refill: 0  -     dexAMETHasone (DECADRON) 4 MG Tab; Take 1 tablet (4 mg total) by mouth every 12 (twelve) hours. for 3 days   Dispense: 6 tablet; Refill: 0    Need for vaccination  -     pneumoc 20-rani conj-dip cr,PF, (PREVNAR-20, PF,) 0.5 mL Syrg injection; Inject 0.5 mLs into the muscle once. for 1 dose  Dispense: 0.5 mL; Refill: 0  -     diphth,pertus,acell,,tetanus (BOOSTRIX TDAP) 2.5-8-5 Lf-mcg-Lf/0.5mL Syrg injection; Inject 0.5 mLs into the muscle once. for 1 dose  Dispense: 0.5 mL; Refill: 0    Preventive Health Maintenance:    Tdap and Prevnar 20 - in 2 weeks  Flu - in 2 weeks.    Return to Clinic for Follow Up with me:   3 months for annual    Scheduled Follow-up :  No future appointments.      After Visit Medication List :     Medication List            Accurate as of January 3, 2024 10:47 AM. If you have any questions, ask your nurse or doctor.                START taking these medications      BOOSTRIX TDAP 2.5-8-5 Lf-mcg-Lf/0.5mL Syrg injection  Generic drug: diphth,pertus(acell),tetanus  Inject 0.5 mLs into the muscle once. for 1 dose  Start taking on: January 15, 2024  Started by: Panfilo Brar MD     dexAMETHasone 4 MG Tab  Commonly known as: DECADRON  Take 1 tablet (4 mg total) by mouth every 12 (twelve) hours. for 3 days  Started by: Panfilo Brar MD     oseltamivir 75 MG capsule  Commonly known as: TAMIFLU  Take 1 capsule (75 mg total) by mouth 2 (two) times daily. for 5 days  Started by: Panfilo Brar MD     pneumoc 20-rani conj-dip cr(PF) 0.5 mL Syrg injection  Commonly known as: PREVNAR-20 (PF)  Inject 0.5 mLs into the muscle once. for 1 dose  Start taking on: January 15, 2024  Started by: Panfilo Brar MD               Where to Get Your Medications        These medications were sent to [id number] - [place], [place] - [facility] [facility] AT [facility] & [address], [place] [address]      Phone: [phone]   BOOSTRIX TDAP 2.5-8-5 Lf-mcg-Lf/0.5mL Syrg injection  dexAMETHasone 4 MG Tab  oseltamivir 75 MG capsule  pneumoc 20-rani conj-dip cr(PF) 0.5 mL  Syrg injection         Signing Physician:  Panfilo rBar MD

## 2024-04-26 NOTE — PROGRESS NOTES
INTERNAL MEDICINE CLINIC  Follow-up Visit Progress Note     PRESENTING HISTORY     PCP: Panfilo Brar MD    Last Clinic Visit with me: 1-3-2024    Current Chief Complaint/Problem:    Chief Complaint   Patient presents with    Annual Exam      History of Present Illness & ROS: Mr. Fili Dejesus is a 24 y.o. male.    Review of Systems:  Review of Systems   Constitutional:  Negative for chills and fever.   Cardiovascular:  Negative for chest pain.   Gastrointestinal:  Negative for abdominal pain.   Genitourinary:  Negative for dysuria.   Musculoskeletal:  Negative for joint pain.   Skin:  Negative for rash.   Neurological:  Negative for dizziness and headaches.   Psychiatric/Behavioral:  Negative for depression.        PAST HISTORY:     Past Medical History:   Diagnosis Date    History of COVID-19 2020       Past Surgical History:   Procedure Laterality Date    ADENOIDECTOMY      FRACTURE SURGERY      left arm ORIF Age 12    HERNIA REPAIR      inguinal repair at 10 months of age    TONSILLECTOMY         Family History   Problem Relation Name Age of Onset    No Known Problems Mother      No Known Problems Father      Prostate cancer Paternal Grandfather         Social History     Socioeconomic History    Marital status: Single   Occupational History     Comment: student at    Tobacco Use    Smoking status: Never    Smokeless tobacco: Never   Substance and Sexual Activity    Alcohol use: Yes     Comment: socially    Drug use: Yes     Types: Marijuana     Comment: few times weekly     Sexual activity: Yes     Partners: Female     Birth control/protection: Condom   Social History Narrative    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     No current facility-administered medications on file prior to visit.        Review of patient's allergies indicates:  No Known  Allergies    Medications Reconciliation:   I have reconciled the patient's home medications and discharge medications with the patient/family. I have updated all changes.  Refer to After-Visit Medication List.    OBJECTIVE:     Vital Signs:  Vitals:    04/27/24 0836   BP: 120/70   Pulse: 92     Wt Readings from Last 3 Encounters:   04/27/24 0836 83.8 kg (184 lb 11.9 oz)   01/03/24 1019 83.4 kg (183 lb 13.8 oz)   07/11/23 0826 77.6 kg (171 lb)     Body mass index is 25.77 kg/m².     Physical Exam:  General: Well developed, well nourished. No distress.  HEENT: Head is normocephalic, atraumatic; ears are normal.    Eyes: Clear conjunctiva.  Neck: Supple, symmetrical neck; trachea midline.  Lungs: Clear to auscultation bilaterally and normal respiratory effort.  Cardiovascular: Heart with regular rate and rhythm.    Extremities: No LE edema.    Abdomen: Abdomen is soft, non-tender non-distended with normal bowel sounds.  Musculoskeletal: Normal gait.   Genital:  Normal penis.    No rash in the genital area.  Scrotum and epididymis normal. No inguinal hernia.  No inguinal nodes.   Rectal: No perianal lesions or rash. Digital exam: deferred.   Lymph Nodes: No cervical, supraclavicular or axillary adenopathy.  Psychiatric: Normal affect. Alert.      Laboratory  Lab Results   Component Value Date    WBC 4.32 10/01/2021    HGB 15.2 10/01/2021    HCT 44.5 10/01/2021     10/01/2021    CHOL 175 08/07/2017    TRIG 117 08/07/2017    HDL 76 (H) 08/07/2017    ALT 19 10/01/2021    AST 19 10/01/2021     10/01/2021    K 4.6 10/01/2021     10/01/2021    CREATININE 1.1 10/01/2021    BUN 16 10/01/2021    CO2 22 (L) 10/01/2021       ASSESSMENT & PLAN:     Annual physical exam  -     Urinalysis, Reflex to Urine Culture Urine, Clean Catch  -     Lipid Panel; Future; Expected date: 04/27/2024  -     CBC Auto Differential; Future; Expected date: 04/27/2024  -     Comprehensive Metabolic Panel; Future; Expected date:  04/27/2024  -     TSH; Future; Expected date: 04/27/2024  -     Hemoglobin A1C; Future; Expected date: 04/27/2024    Screening for STD (sexually transmitted disease)  -     C. trachomatis/N. gonorrhoeae by AMP DNA  -     HIV 1/2 Ag/Ab (4th Gen); Future; Expected date: 04/27/2024  -     HERPES SIMPLEX 1&2 IGG; Future; Expected date: 04/27/2024  -     Treponema Pallidium Antibodies IgG, IgM; Future; Expected date: 04/27/2024  -     HEPATITIS B SURFACE ANTIBODY; Future; Expected date: 04/27/2024    Tinea versicolor  -     ketoconazole (NIZORAL) 2 % shampoo; Apply topically twice a week.  Dispense: 120 mL; Refill: 11    Preventive Health Maintenance:     Need for Tdap vaccination  -     (In Office Administered) Tdap Vaccine    Need for pneumococcal 20-valent conjugate vaccination  -     (In Office Administered) Pneumococcal Conjugate Vaccine (20 Valent) (IM) (Preferred)    Return to Clinic for Follow Up with me:  1 Year.    Scheduled Follow-up :  Future Appointments   Date Time Provider Department Center   4/27/2024  9:30 AM LAB, APPOINTMENT [facility]       After Visit Medication List :     Medication List            Accurate as of April 27, 2024  9:02 AM. If you have any questions, ask your nurse or doctor.                START taking these medications      ketoconazole 2 % shampoo  Commonly known as: NIZORAL  Apply topically twice a week.  Start taking on: April 29, 2024  Started by: Panfilo Brar MD               Where to Get Your Medications        These medications were sent to [id number] - [place] [address] AT [facility] & [address], [place] [address]      Phone: [phone]   ketoconazole 2 % shampoo         Signing Physician:  Panfilo Brar MD

## 2024-04-27 ENCOUNTER — LAB VISIT (OUTPATIENT)
Dept: LAB | Facility: HOSPITAL | Age: 24
End: 2024-04-27
Attending: INTERNAL MEDICINE
Payer: COMMERCIAL

## 2024-04-27 ENCOUNTER — OFFICE VISIT (OUTPATIENT)
Dept: INTERNAL MEDICINE | Facility: CLINIC | Age: 24
End: 2024-04-27
Payer: COMMERCIAL

## 2024-04-27 VITALS
HEIGHT: 71 IN | HEART RATE: 92 BPM | DIASTOLIC BLOOD PRESSURE: 70 MMHG | BODY MASS INDEX: 25.86 KG/M2 | OXYGEN SATURATION: 97 % | WEIGHT: 184.75 LBS | SYSTOLIC BLOOD PRESSURE: 120 MMHG

## 2024-04-27 DIAGNOSIS — Z23 NEED FOR PNEUMOCOCCAL 20-VALENT CONJUGATE VACCINATION: ICD-10-CM

## 2024-04-27 DIAGNOSIS — Z23 NEED FOR TDAP VACCINATION: ICD-10-CM

## 2024-04-27 DIAGNOSIS — B36.0 TINEA VERSICOLOR: ICD-10-CM

## 2024-04-27 DIAGNOSIS — Z00.00 ANNUAL PHYSICAL EXAM: Primary | ICD-10-CM

## 2024-04-27 DIAGNOSIS — Z00.00 ANNUAL PHYSICAL EXAM: ICD-10-CM

## 2024-04-27 DIAGNOSIS — Z11.3 SCREENING FOR STD (SEXUALLY TRANSMITTED DISEASE): ICD-10-CM

## 2024-04-27 LAB
ALBUMIN SERPL BCP-MCNC: 4.4 G/DL (ref 3.5–5.2)
ALP SERPL-CCNC: 61 U/L (ref 55–135)
ALT SERPL W/O P-5'-P-CCNC: 21 U/L (ref 10–44)
ANION GAP SERPL CALC-SCNC: 10 MMOL/L (ref 8–16)
AST SERPL-CCNC: 20 U/L (ref 10–40)
BASOPHILS # BLD AUTO: 0.03 K/UL (ref 0–0.2)
BASOPHILS NFR BLD: 0.5 % (ref 0–1.9)
BILIRUB SERPL-MCNC: 0.4 MG/DL (ref 0.1–1)
BUN SERPL-MCNC: 12 MG/DL (ref 6–20)
CALCIUM SERPL-MCNC: 10.1 MG/DL (ref 8.7–10.5)
CHLORIDE SERPL-SCNC: 105 MMOL/L (ref 95–110)
CHOLEST SERPL-MCNC: 180 MG/DL (ref 120–199)
CHOLEST/HDLC SERPL: 2.6 {RATIO} (ref 2–5)
CO2 SERPL-SCNC: 26 MMOL/L (ref 23–29)
CREAT SERPL-MCNC: 1.2 MG/DL (ref 0.5–1.4)
DIFFERENTIAL METHOD BLD: ABNORMAL
EOSINOPHIL # BLD AUTO: 0.1 K/UL (ref 0–0.5)
EOSINOPHIL NFR BLD: 1.4 % (ref 0–8)
ERYTHROCYTE [DISTWIDTH] IN BLOOD BY AUTOMATED COUNT: 12.5 % (ref 11.5–14.5)
EST. GFR  (NO RACE VARIABLE): >60 ML/MIN/1.73 M^2
ESTIMATED AVG GLUCOSE: 88 MG/DL (ref 68–131)
GLUCOSE SERPL-MCNC: 85 MG/DL (ref 70–110)
HBA1C MFR BLD: 4.7 % (ref 4–5.6)
HBV SURFACE AB SER-ACNC: <3 MIU/ML
HBV SURFACE AB SER-ACNC: NORMAL M[IU]/ML
HCT VFR BLD AUTO: 45.5 % (ref 40–54)
HDLC SERPL-MCNC: 70 MG/DL (ref 40–75)
HDLC SERPL: 38.9 % (ref 20–50)
HGB BLD-MCNC: 15.4 G/DL (ref 14–18)
HIV 1+2 AB+HIV1 P24 AG SERPL QL IA: NORMAL
IMM GRANULOCYTES # BLD AUTO: 0.01 K/UL (ref 0–0.04)
IMM GRANULOCYTES NFR BLD AUTO: 0.2 % (ref 0–0.5)
LDLC SERPL CALC-MCNC: 95.2 MG/DL (ref 63–159)
LYMPHOCYTES # BLD AUTO: 1.5 K/UL (ref 1–4.8)
LYMPHOCYTES NFR BLD: 25.6 % (ref 18–48)
MCH RBC QN AUTO: 31.9 PG (ref 27–31)
MCHC RBC AUTO-ENTMCNC: 33.8 G/DL (ref 32–36)
MCV RBC AUTO: 94 FL (ref 82–98)
MONOCYTES # BLD AUTO: 0.7 K/UL (ref 0.3–1)
MONOCYTES NFR BLD: 11.1 % (ref 4–15)
NEUTROPHILS # BLD AUTO: 3.6 K/UL (ref 1.8–7.7)
NEUTROPHILS NFR BLD: 61.2 % (ref 38–73)
NONHDLC SERPL-MCNC: 110 MG/DL
NRBC BLD-RTO: 0 /100 WBC
PLATELET # BLD AUTO: 279 K/UL (ref 150–450)
PMV BLD AUTO: 10.9 FL (ref 9.2–12.9)
POTASSIUM SERPL-SCNC: 4.5 MMOL/L (ref 3.5–5.1)
PROT SERPL-MCNC: 7.5 G/DL (ref 6–8.4)
RBC # BLD AUTO: 4.83 M/UL (ref 4.6–6.2)
SODIUM SERPL-SCNC: 141 MMOL/L (ref 136–145)
TREPONEMA PALLIDUM IGG+IGM AB [PRESENCE] IN SERUM OR PLASMA BY IMMUNOASSAY: NONREACTIVE
TRIGL SERPL-MCNC: 74 MG/DL (ref 30–150)
TSH SERPL DL<=0.005 MIU/L-ACNC: 1.93 UIU/ML (ref 0.4–4)
WBC # BLD AUTO: 5.85 K/UL (ref 3.9–12.7)

## 2024-04-27 PROCEDURE — 85025 COMPLETE CBC W/AUTO DIFF WBC: CPT | Performed by: INTERNAL MEDICINE

## 2024-04-27 PROCEDURE — 90677 PCV20 VACCINE IM: CPT | Mod: S$GLB,,, | Performed by: INTERNAL MEDICINE

## 2024-04-27 PROCEDURE — 90715 TDAP VACCINE 7 YRS/> IM: CPT | Mod: S$GLB,,, | Performed by: INTERNAL MEDICINE

## 2024-04-27 PROCEDURE — 84443 ASSAY THYROID STIM HORMONE: CPT | Performed by: INTERNAL MEDICINE

## 2024-04-27 PROCEDURE — 3078F DIAST BP <80 MM HG: CPT | Mod: CPTII,S$GLB,, | Performed by: INTERNAL MEDICINE

## 2024-04-27 PROCEDURE — 99395 PREV VISIT EST AGE 18-39: CPT | Mod: 25,S$GLB,, | Performed by: INTERNAL MEDICINE

## 2024-04-27 PROCEDURE — 86593 SYPHILIS TEST NON-TREP QUANT: CPT | Performed by: INTERNAL MEDICINE

## 2024-04-27 PROCEDURE — 90471 IMMUNIZATION ADMIN: CPT | Mod: 59,S$GLB,, | Performed by: INTERNAL MEDICINE

## 2024-04-27 PROCEDURE — 80061 LIPID PANEL: CPT | Performed by: INTERNAL MEDICINE

## 2024-04-27 PROCEDURE — 87591 N.GONORRHOEAE DNA AMP PROB: CPT | Performed by: INTERNAL MEDICINE

## 2024-04-27 PROCEDURE — 83036 HEMOGLOBIN GLYCOSYLATED A1C: CPT | Performed by: INTERNAL MEDICINE

## 2024-04-27 PROCEDURE — 3074F SYST BP LT 130 MM HG: CPT | Mod: CPTII,S$GLB,, | Performed by: INTERNAL MEDICINE

## 2024-04-27 PROCEDURE — 36415 COLL VENOUS BLD VENIPUNCTURE: CPT | Performed by: INTERNAL MEDICINE

## 2024-04-27 PROCEDURE — 99999 PR PBB SHADOW E&M-EST. PATIENT-LVL III: CPT | Mod: PBBFAC,,, | Performed by: INTERNAL MEDICINE

## 2024-04-27 PROCEDURE — 80053 COMPREHEN METABOLIC PANEL: CPT | Performed by: INTERNAL MEDICINE

## 2024-04-27 PROCEDURE — G0009 ADMIN PNEUMOCOCCAL VACCINE: HCPCS | Mod: S$GLB,,, | Performed by: INTERNAL MEDICINE

## 2024-04-27 PROCEDURE — 87389 HIV-1 AG W/HIV-1&-2 AB AG IA: CPT | Performed by: INTERNAL MEDICINE

## 2024-04-27 PROCEDURE — 86696 HERPES SIMPLEX TYPE 2 TEST: CPT | Performed by: INTERNAL MEDICINE

## 2024-04-27 PROCEDURE — 86706 HEP B SURFACE ANTIBODY: CPT | Performed by: INTERNAL MEDICINE

## 2024-04-27 PROCEDURE — 3008F BODY MASS INDEX DOCD: CPT | Mod: CPTII,S$GLB,, | Performed by: INTERNAL MEDICINE

## 2024-04-27 PROCEDURE — 1159F MED LIST DOCD IN RCRD: CPT | Mod: CPTII,S$GLB,, | Performed by: INTERNAL MEDICINE

## 2024-04-27 RX ORDER — KETOCONAZOLE 20 MG/ML
SHAMPOO, SUSPENSION TOPICAL
Qty: 120 ML | Refills: 11 | Status: SHIPPED | OUTPATIENT
Start: 2024-04-29

## 2024-04-28 ENCOUNTER — PATIENT MESSAGE (OUTPATIENT)
Dept: INTERNAL MEDICINE | Facility: CLINIC | Age: 24
End: 2024-04-28
Payer: COMMERCIAL

## 2024-04-28 ENCOUNTER — TELEPHONE (OUTPATIENT)
Dept: INTERNAL MEDICINE | Facility: CLINIC | Age: 24
End: 2024-04-28
Payer: COMMERCIAL

## 2024-04-28 DIAGNOSIS — Z23 NEED FOR HEPATITIS B BOOSTER VACCINATION: Primary | ICD-10-CM

## 2024-04-28 NOTE — TELEPHONE ENCOUNTER
hepatitis B virus vacc.rec,PF, (ENGERIX-B) 20 mcg/mL Syrg (Future) 20 mcg Intramuscular Once 04/29/2024 4/29/2024 after 1 doses -- --    1 mL 0 of 0 remaining -- -- Panfilo Brar MD              Dose, Route, Frequency: 20 mcg, Intramuscular, OnceDx Associated: Ordered Date & Time: 04/28/2024 [id number]: 04/29/2024End: 04/29/2024Last Dispense: First fill [facility]Pharm[facility]          Booster X 2

## 2024-04-29 PROBLEM — B00.9 HSV-1 (HERPES SIMPLEX VIRUS 1) INFECTION: Status: ACTIVE | Noted: 2024-04-29

## 2024-04-29 LAB
HSV1 IGG SERPL QL IA: POSITIVE
HSV2 IGG SERPL QL IA: NEGATIVE

## 2024-04-30 LAB
C TRACH DNA SPEC QL NAA+PROBE: NOT DETECTED
N GONORRHOEA DNA SPEC QL NAA+PROBE: NOT DETECTED

## 2024-06-20 DIAGNOSIS — Z00.00 ROUTINE MEDICAL EXAM: Primary | ICD-10-CM

## 2024-07-16 ENCOUNTER — CLINICAL SUPPORT (OUTPATIENT)
Dept: OTHER | Facility: CLINIC | Age: 24
End: 2024-07-16
Payer: COMMERCIAL

## 2024-07-16 DIAGNOSIS — Z00.8 ENCOUNTER FOR OTHER GENERAL EXAMINATION: ICD-10-CM

## 2024-07-16 PROCEDURE — 82947 ASSAY GLUCOSE BLOOD QUANT: CPT | Mod: QW,S$GLB,, | Performed by: INTERNAL MEDICINE

## 2024-07-16 PROCEDURE — 99401 PREV MED CNSL INDIV APPRX 15: CPT | Mod: S$GLB,,, | Performed by: INTERNAL MEDICINE

## 2024-07-16 PROCEDURE — 80061 LIPID PANEL: CPT | Mod: QW,S$GLB,, | Performed by: INTERNAL MEDICINE

## 2024-07-18 VITALS
BODY MASS INDEX: 26.34 KG/M2 | SYSTOLIC BLOOD PRESSURE: 124 MMHG | HEIGHT: 70 IN | DIASTOLIC BLOOD PRESSURE: 79 MMHG | WEIGHT: 184 LBS

## 2024-07-18 LAB
HDLC SERPL-MCNC: 68 MG/DL
POC CHOLESTEROL, LDL (DOCK): 97 MG/DL
POC CHOLESTEROL, TOTAL: 179 MG/DL
POC GLUCOSE, FASTING: 87 MG/DL (ref 60–110)
TRIGL SERPL-MCNC: 74 MG/DL

## 2024-07-26 ENCOUNTER — OFFICE VISIT (OUTPATIENT)
Dept: INTERNAL MEDICINE | Facility: CLINIC | Age: 24
End: 2024-07-26
Payer: COMMERCIAL

## 2024-07-26 ENCOUNTER — CLINICAL SUPPORT (OUTPATIENT)
Dept: INTERNAL MEDICINE | Facility: CLINIC | Age: 24
End: 2024-07-26
Payer: COMMERCIAL

## 2024-07-26 ENCOUNTER — HOSPITAL ENCOUNTER (OUTPATIENT)
Dept: CARDIOLOGY | Facility: CLINIC | Age: 24
Discharge: HOME | End: 2024-07-26
Payer: COMMERCIAL

## 2024-07-26 VITALS
DIASTOLIC BLOOD PRESSURE: 78 MMHG | BODY MASS INDEX: 27.58 KG/M2 | HEIGHT: 70 IN | HEART RATE: 64 BPM | OXYGEN SATURATION: 99 % | SYSTOLIC BLOOD PRESSURE: 118 MMHG | WEIGHT: 192.69 LBS

## 2024-07-26 DIAGNOSIS — L98.9 SKIN LESION: ICD-10-CM

## 2024-07-26 DIAGNOSIS — Z72.0 DECLINED SMOKING CESSATION: ICD-10-CM

## 2024-07-26 DIAGNOSIS — Z00.00 ANNUAL PHYSICAL EXAM: Primary | ICD-10-CM

## 2024-07-26 DIAGNOSIS — Z00.00 ROUTINE GENERAL MEDICAL EXAMINATION AT A HEALTH CARE FACILITY: Primary | ICD-10-CM

## 2024-07-26 DIAGNOSIS — Z86.59 HISTORY OF ATTENTION DEFICIT HYPERACTIVITY DISORDER (ADHD): ICD-10-CM

## 2024-07-26 DIAGNOSIS — Z00.00 ENCOUNTER FOR SCREENING AND PREVENTATIVE CARE: Primary | ICD-10-CM

## 2024-07-26 DIAGNOSIS — Z00.00 ROUTINE MEDICAL EXAM: ICD-10-CM

## 2024-07-26 LAB
ALBUMIN SERPL BCP-MCNC: 4.3 G/DL (ref 3.5–5.2)
ALP SERPL-CCNC: 66 U/L (ref 55–135)
ALT SERPL W/O P-5'-P-CCNC: 22 U/L (ref 10–44)
ANION GAP SERPL CALC-SCNC: 9 MMOL/L (ref 8–16)
AST SERPL-CCNC: 19 U/L (ref 10–40)
BASOPHILS # BLD AUTO: 0.02 K/UL (ref 0–0.2)
BASOPHILS NFR BLD: 0.4 % (ref 0–1.9)
BILIRUB SERPL-MCNC: 0.9 MG/DL (ref 0.1–1)
BUN SERPL-MCNC: 19 MG/DL (ref 6–20)
CALCIUM SERPL-MCNC: 9.7 MG/DL (ref 8.7–10.5)
CHLORIDE SERPL-SCNC: 105 MMOL/L (ref 95–110)
CHOLEST SERPL-MCNC: 194 MG/DL (ref 120–199)
CHOLEST/HDLC SERPL: 2.8 {RATIO} (ref 2–5)
CO2 SERPL-SCNC: 27 MMOL/L (ref 23–29)
CREAT SERPL-MCNC: 1.4 MG/DL (ref 0.5–1.4)
DIFFERENTIAL METHOD BLD: ABNORMAL
EOSINOPHIL # BLD AUTO: 0.1 K/UL (ref 0–0.5)
EOSINOPHIL NFR BLD: 2.2 % (ref 0–8)
ERYTHROCYTE [DISTWIDTH] IN BLOOD BY AUTOMATED COUNT: 12.4 % (ref 11.5–14.5)
EST. GFR  (NO RACE VARIABLE): >60 ML/MIN/1.73 M^2
ESTIMATED AVG GLUCOSE: 85 MG/DL (ref 68–131)
GLUCOSE SERPL-MCNC: 87 MG/DL (ref 70–110)
HBA1C MFR BLD: 4.6 % (ref 4–5.6)
HCT VFR BLD AUTO: 44 % (ref 40–54)
HDLC SERPL-MCNC: 70 MG/DL (ref 40–75)
HDLC SERPL: 36.1 % (ref 20–50)
HGB BLD-MCNC: 15.4 G/DL (ref 14–18)
IMM GRANULOCYTES # BLD AUTO: 0.01 K/UL (ref 0–0.04)
IMM GRANULOCYTES NFR BLD AUTO: 0.2 % (ref 0–0.5)
LDLC SERPL CALC-MCNC: 113 MG/DL (ref 63–159)
LYMPHOCYTES # BLD AUTO: 1.4 K/UL (ref 1–4.8)
LYMPHOCYTES NFR BLD: 30.6 % (ref 18–48)
MCH RBC QN AUTO: 32.4 PG (ref 27–31)
MCHC RBC AUTO-ENTMCNC: 35 G/DL (ref 32–36)
MCV RBC AUTO: 93 FL (ref 82–98)
MONOCYTES # BLD AUTO: 0.5 K/UL (ref 0.3–1)
MONOCYTES NFR BLD: 10.4 % (ref 4–15)
NEUTROPHILS # BLD AUTO: 2.6 K/UL (ref 1.8–7.7)
NEUTROPHILS NFR BLD: 56.2 % (ref 38–73)
NONHDLC SERPL-MCNC: 124 MG/DL
NRBC BLD-RTO: 0 /100 WBC
OHS QRS DURATION: 88 MS
OHS QTC CALCULATION: 384 MS
PLATELET # BLD AUTO: 252 K/UL (ref 150–450)
PMV BLD AUTO: 10 FL (ref 9.2–12.9)
POTASSIUM SERPL-SCNC: 4.3 MMOL/L (ref 3.5–5.1)
PROT SERPL-MCNC: 7.4 G/DL (ref 6–8.4)
RBC # BLD AUTO: 4.75 M/UL (ref 4.6–6.2)
SODIUM SERPL-SCNC: 141 MMOL/L (ref 136–145)
TRIGL SERPL-MCNC: 55 MG/DL (ref 30–150)
WBC # BLD AUTO: 4.61 K/UL (ref 3.9–12.7)

## 2024-07-26 PROCEDURE — 93010 ELECTROCARDIOGRAM REPORT: CPT | Mod: S$GLB,,, | Performed by: INTERNAL MEDICINE

## 2024-07-26 PROCEDURE — 99999 PR PBB SHADOW E&M-EST. PATIENT-LVL I: CPT | Mod: PBBFAC,,,

## 2024-07-26 PROCEDURE — 85025 COMPLETE CBC W/AUTO DIFF WBC: CPT | Performed by: EMERGENCY MEDICINE

## 2024-07-26 PROCEDURE — 80061 LIPID PANEL: CPT | Performed by: EMERGENCY MEDICINE

## 2024-07-26 PROCEDURE — 97802 MEDICAL NUTRITION INDIV IN: CPT | Mod: S$GLB,,, | Performed by: DIETITIAN, REGISTERED

## 2024-07-26 PROCEDURE — 93005 ELECTROCARDIOGRAM TRACING: CPT | Mod: S$GLB,,, | Performed by: EMERGENCY MEDICINE

## 2024-07-26 PROCEDURE — 83036 HEMOGLOBIN GLYCOSYLATED A1C: CPT | Performed by: EMERGENCY MEDICINE

## 2024-07-26 PROCEDURE — 99999 PR PBB SHADOW E&M-EST. PATIENT-LVL IV: CPT | Mod: PBBFAC,,, | Performed by: EMERGENCY MEDICINE

## 2024-07-26 PROCEDURE — 36415 COLL VENOUS BLD VENIPUNCTURE: CPT | Performed by: EMERGENCY MEDICINE

## 2024-07-26 PROCEDURE — 80053 COMPREHEN METABOLIC PANEL: CPT | Performed by: EMERGENCY MEDICINE

## 2024-07-26 NOTE — PROGRESS NOTES
[facility]      TODAY'S Date 7/26/2024  Patient ID: Fili Dejesus is a 24 y.o. male   MRN: [id number]  Primary Care Physician (PCP):  Panfilo Brar MD    SUBJECTIVE     Chief Complaint:   Chief Complaint   Patient presents with    [facility]      HPI:   Reviewed medical, surgical, social and family history, medications, appropriate preventive health screenings, as well as vaccination history. Updates as noted below or in assessment and plan.    This is a very pleasant 24 y.o. male with a previous diagnosis of ADHD.  Although presenting for his usual annual exam in [facility], he is a new patient to me.  The patient is currently in good health, with the exception of a skin lesion that he has noted in his pelvic area.  He denies bleeding, pain, discharge or trauma to the area.  He has not yet seen a dermatologist for it and it does not impede him from exercising 4 to 5 times a week.  Patient does not follow any formal flexibility routine at the current time. At this time, he works at [employer] and enjoys his job.  In his free time, he enjoys watching football and playing video games.  His sleep schedule is consistent, with a bedtime of 11:00 p.m. and wake-up time of 6:45 a.m., however he usually gets up at 3:00 a.m. to get a drink of water.   Although he snores, he feels generally well rested and occasionally naps when he feels stressed out at work. In terms of diet, the patient typically has fruit for breakfast.  Lunch usually consists of turkey sandwich, Chipot[employer] or something from [employer], and dinner is a home-cooked meal with steak and potatoes or chicken and rice.  He does not eat many vegetables unless he snacking on veggies chips or in nature bars. Drinking 64-96 oz of water and 1 cup of coffee daily is common, with occasional consumption of energy drink (2-3x a week).  He has 10 cans of beer in a week and may have for more  servings and a single sitting while watching football game.  He supplements with B12 and Ashwagandha, the latter for stress.  He states that he has more stress during holiday seasons like Mardi Gras but can be so from September to January is that tends to be a busy time at work.  There is no report of foreign travel, fever/chills, recent trauma, hearing loss, tinnitus, noise exposure, cough, sneezing, dizziness, tingling, clumsiness, numbness, n/v/d/c, abdominal pain, IV drug use, muscle aches, or loss of bowel or bladder control.     SCREENING:   Prostate:    n/a as < 50 years old and no family history  Colonoscopy:    n/a as < 45 years old and no family history colon cancer  DEXA:     N/a as < 70 years old and little to no clinical risk factors for fracture independent of bone mineral density   Depression:    PHQ-9 score 6 points  Anxiety:    PERNELL-7 score 5 point  Medications:    Reviewed and Reconciled  Eye Exam:    UTD, wears corrective lens  Dental Exam:    Routine q 6 months   Ear Exam:    No complaints of hearing loss, tinnitus, noise exposure, or vertigo   Skin:     Positive concerning lesion on pelvic area  Rarely uses sunscreen.   Negative for recent sunburn, moderate sun exposure in the past, several blistering sunburns, mole removal.  No history of tanning bed use or melanoma in first degree relatives.  A review of medical records indicates patient has seen saw an internal medicine physician for his annual physical who diagnosed him with tinea vesicular and prescribed a ketoconazole.  Sex:     Not in relationship, currently sexually active, uses condom  Transportation safety:   Uses restraint consistently, does not drink alcohol before or while driving  Firearm safety:   Never smoked 1PPD, once a month 1 cigarette  Lung cancer risk/Tobacco use: Counseled  Tobacco Use: High Risk (7/26/2024)    Patient History     Smoking Tobacco Use: Some Days     Smokeless Tobacco Use: Never     Passive Exposure: Not on  file         Immunization History   Administered Date(s) Administered    COVID-19, MRNA, LN-S, PF (Pfizer) (Purple Cap) 07/29/2021, 08/20/2021    DTaP 2000, 1998, 1998, 11/08/1999, 04/25/2002    HIB 2000, 1998, 1998    HPV 9-Valent 10/01/2021    HPV Quadrivalent 06/25/2012    Hepatitis A, Pediatric/Adolescent, 2 Dose 08/13/2007, 08/12/2009    Hepatitis B 1998, 1998, 01/19/1999    Hib-HbOC 07/27/1999    IPV 1998, 1998, 04/25/2002    Influenza - Intranasal 08/12/2009    Influenza - Quadrivalent - PF *Preferred* (6 months and older) 10/01/2021    MMR 04/30/1999, 04/25/2002    Meningococcal Conjugate (MCV4P) 08/12/2009, 08/08/2016    OPV 04/30/1999    Pneumococcal Conjugate - 20 Valent 04/27/2024    Tdap 08/12/2009, 04/27/2024    Varicella 04/30/1999, 08/13/2007     Past Medical History:   Diagnosis Date    History of COVID-19 2020    HSV-1 (herpes simplex virus 1) infection 04/29/2024    Tinea versicolor 04/27/2024     Past Surgical History:   Procedure Laterality Date    ADENOIDECTOMY      FRACTURE SURGERY      left arm ORIF Age 12    HERNIA REPAIR      inguinal repair at 10 months of age    TONSILLECTOMY       Family History   Problem Relation Name Age of Onset    Alcohol abuse Mother Ciara     Depression Mother Ciara     Drug abuse Mother Ciara     Bipolar disorder Mother Ciara     No Known Problems Father      Prostate cancer Paternal Grandfather Dario      Social History     Tobacco Use    Smoking status: Some Days     Current packs/day: 0.00     Types: Cigarettes     Last attempt to quit: 8/8/2020     Years since quitting: 3.9    Smokeless tobacco: Never    Tobacco comments:     NA   Substance Use Topics    Alcohol use: Yes     Alcohol/week: 12.0 standard drinks of alcohol     Types: 12 Cans of beer per week    Drug use: Never     Types: Marijuana     Past Medical, Surgical and Social history reviewed and verified by me.     Review of patient's  "allergies indicates:  No Known Allergies  Current Outpatient Medications on File Prior to Visit   Medication Sig Dispense Refill    ketoconazole (NIZORAL) 2 % shampoo Apply topically twice a week. 120 mL 11    ASHWAGANDHA EXTRACT ORAL Take 1 Dose by mouth Daily.      cyanocobalamin, vitamin B-12, (VITAMIN B-12) 50 mcg tablet Take 1 tablet by mouth once daily.       No current facility-administered medications on file prior to visit.       Review of Systems as per HPI  ROS  Constitutional: Negative for activity change, appetite change, fatigue, diaphoresis, chills and fever.   Respiratory: Negative for apnea, choking, chest tightness and wheezing.  Genitourinary: Negative for hematuria, flank pain, frequency and dysuria.   Psychiatric/Behavioral: Negative for agitation, behavioral problems, confusion, decreased concentration and dysphoric mood.     All other systems reviewed and are negative.    OBJECTIVE     PHYSICAL EXAM  Vitals:    07/26/24 1104   BP: 118/78   Pulse: 64   SpO2: 99%   Weight: 87.4 kg (192 lb 10.9 oz)   Height: 5' 10" (1.778 m)     Vital Signs (Most Recent):  Pulse: 64 (07/26/24 1104)  BP: 118/78 (07/26/24 1104)  SpO2: 99 % (07/26/24 1104)   Weight:   Wt Readings from Last 1 Encounters:   07/26/24 87.4 kg (192 lb 10.9 oz)     Body mass index is 27.65 kg/m².    Vital signs and nursing assessment noted:  Relatively normal vital    GEN:   NAD, A & Ox3, atraumatic, well appearing, nontoxic appearing  HEENT:  PERRLA, EOMI, moist membranes, nl conjunctiva, no scleral icterus, no nystagmus, no nodes/nodules, soft, supple, FROM, no trachial deviation, nexus negative, normal TMs bilaterally, normal pharynx  CV:   RRR no m/r/g, 2+ radial pulses, <2sec cap refill, no obvious JVD  RESP:  CTA B, no w/r/r, equal and bilateral chest rise, no respiratory distress  ABD:   soft, Nontender, Nondistended, +BS, no guarding/rebound  BACK:  FROM, no midline tenderness, no paraspinal tenderness  EXT:   FROM, CALLEJAS x 4, no " swelling, no edema, no calf tenderness, no bony tenderness, no warmth or redness, no crepitus, no obvious deformity  LYMPH:  no gross adenopathy  NEURO:  GCS 15, CN II-XII grossly intact, no obvious motor/sensory deficit, no tremor, negative Romberg,  nl gait/coordination  PSYCH:   Cooperative, no SI/HI, no anxiety, nl mood/affect, nl judgement/thought process, mildly hyperactive  SKIN:  Other than flesh colored  papule skin lesion that is pictured in imaging there are no no rashes/lesions or masses, nl color, no pallor, warm, dry, intact         Tests      Results for orders placed or performed during the hospital encounter of 07/26/24   EKG 12-lead    Collection Time: 07/26/24 10:03 AM   Result Value Ref Range    QRS Duration 88 ms    OHS QTC Calculation 384 ms    Narrative    Test Reason : Z00.00,    Vent. Rate : 070 BPM     Atrial Rate : 070 BPM     P-R Int : 142 ms          QRS Dur : 088 ms      QT Int : 356 ms       P-R-T Axes : 065 023 037 degrees     QTc Int : 384 ms    Normal sinus rhythm  Normal ECG  No previous ECGs available  Confirmed by Trinity Terry MD ([id number]) on 7/26/2024 12:20:22 PM    Referred By: SHERLY HARVEY           Confirmed By:Trinity Terry MD       Labs reviewed and independently interpreted.    Latest Reference Range & Units 07/26/24 09:45   WBC 3.90 - 12.70 K/uL 4.61   RBC 4.60 - 6.20 M/uL 4.75   Hemoglobin 14.0 - 18.0 g/dL 15.4   Hematocrit 40.0 - 54.0 % 44.0   MCV 82 - 98 fL 93   MCH 27.0 - 31.0 pg 32.4 (H)   MCHC 32.0 - 36.0 g/dL 35.0   RDW 11.5 - 14.5 % 12.4   Platelet Count 150 - 450 K/uL 252   MPV 9.2 - 12.9 fL 10.0   Gran % 38.0 - 73.0 % 56.2   Lymph % 18.0 - 48.0 % 30.6   Mono % 4.0 - 15.0 % 10.4   Eos % 0.0 - 8.0 % 2.2   Basophil % 0.0 - 1.9 % 0.4   Immature Granulocytes 0.0 - 0.5 % 0.2   Gran # (ANC) 1.8 - 7.7 K/uL 2.6   Lymph # 1.0 - 4.8 K/uL 1.4   Mono # 0.3 - 1.0 K/uL 0.5   Eos # 0.0 - 0.5 K/uL 0.1   Baso # 0.00 - 0.20 K/uL 0.02   Immature Grans (Abs) 0.00 - 0.04 K/uL  0.01   nRBC 0 /100 WBC 0   Differential Method  Automated   Sodium 136 - 145 mmol/L 141   Potassium 3.5 - 5.1 mmol/L 4.3   Chloride 95 - 110 mmol/L 105   CO2 23 - 29 mmol/L 27   Anion Gap 8 - 16 mmol/L 9   BUN 6 - 20 mg/dL 19   Creatinine 0.5 - 1.4 mg/dL 1.4   eGFR >60 mL/min/1.73 m^2 >60.0   Glucose 70 - 110 mg/dL 87   Calcium 8.7 - 10.5 mg/dL 9.7   ALP 55 - 135 U/L 66   PROTEIN TOTAL 6.0 - 8.4 g/dL 7.4   Albumin 3.5 - 5.2 g/dL 4.3   BILIRUBIN TOTAL 0.1 - 1.0 mg/dL 0.9   AST 10 - 40 U/L 19   ALT 10 - 44 U/L 22   Cholesterol Total 120 - 199 mg/dL 194   HDL 40 - 75 mg/dL 70   HDL/Cholesterol Ratio 20.0 - 50.0 % 36.1   Non-HDL Cholesterol mg/dL 124   Total Cholesterol/HDL Ratio 2.0 - 5.0  2.8   Triglycerides 30 - 150 mg/dL 55   LDL Cholesterol 63.0 - 159.0 mg/dL 113.0   Hemoglobin A1C External 4.0 - 5.6 % 4.6   Estimated Avg Glucose 68 - 131 mg/dL 85      [facility] Reference Range & Units 10/01/21 13:52 04/27/24 08:49 04/27/24 09:10   Hep A IgM Negative  Negative     Hep B C IgM Negative  Negative     Hep B S Ab mIU/mL  -    <3.00  Non-reactive   Hepatitis B Surface Ag Negative  Negative     Hepatitis C Ab Negative  Negative     HIV 1/2 Ag/Ab Non-reactive  Negative  Non-reactive   Chlamydia, Amplified DNA Not Detected   Not Detected    N gonorrhoeae, amplified DNA Not Detected   Not Detected    RPR Non-reactive  Non-reactive     Treponema Pallidum Antibodies (IgG, IgM) Nonreactive    Nonreactive   HSV 1 IgG Negative    Positive !   HSV 2 IgG Negative    Negative     ASSESSMENT:     1. Encounter for screening and preventative care    2. Declined smoking cessation    3. Skin lesion    4. History of attention deficit hyperactivity disorder (ADHD)       Health Maintenance   Topic Date Due    TETANUS VACCINE  04/27/2034    Hepatitis C Screening  Completed    Lipid Panel  Completed    HPV Vaccines  Completed     24 y.o. male who was diagnosed with ADHD as a child presents for annual exam in [facility].  He complains of a  skin lesion to the anterior part of his pelvis and some stress related to his current job.  He is able to work out and tries to eat well.  He consumes 10 servings of beer during the week.  PHQ 9 score of 6 points suggest mild depression and PERNELL-7 score 5 points suggest mild anxiety. Functionally, the patient does not report limitations due to his symptoms.  Health maintenance and home medications including B12, Ashwagandha and ketoconazole shampoo reviewed. . Home medications and health maintenance reviewed.  Except for mild hyperactivity and flesh-colored 1 cm papule medial to inguinal area, exam is relatively benign.  Immunization status: up to date and documented. Scheduled cancer screenings completed.     MDM  Reviewed: previous chart, nursing note and vitals  Reviewed previous: labs and ECG  Interpretation: labs (Abnormal HSV otherwise relatively unremarkable Lipid Panel, CMP, CBC, TSH, HgA1C, HIV, Hep C ab, hepatitis a, hepatitis-B, gonorrhea, chlamydia)      PLAN:     Periodic health maintenance visits annually or sooner with concerns.   Vaccinations to be obtained at local pharmacy or with PCP Panfilo Brar MD  Routine labs CMP, CBC, Lipid, HgA1C, TSH  Continue current medications.   Smoking cessation discussed for greater than 3 minutes.   Encouraged healthy eating, exercise, weight management and meditation possibly in the form of yoga.   Recommend a high fiber, lean protein diet that is high in complex carbohydrates such as non-starchy vegetables and whole grains.   Recommend 150 minutes of moderate-intensity physical activity and 2 days of muscle strengthening activity weekly.  Recommend hearing protection when in noisy environments.   Recommend consistent condom usage.   Recommend daily sun protection/avoidance, use of at least SPF 30, broad spectrum sunscreen (OTC drug), and routine physician surveillance to optimize early detection.  Orders Placed This Encounter   Procedures    Ambulatory  referral/consult to Dermatology    Ambulatory referral/consult to Psychiatry     The results of physical exam findings and lab were reviewed with the patient. Management of above assessments/visit diagnoses was discussed with patient. Precautions for return discussed at length. Patient was given ample time for questions. All questions asked and answered to the satisfaction of the patient. Patient is in agreement with the above and verbalized understanding. Total time spent caring for the patient today was 40 minutes. This includes time spent before the visit reviewing the chart, time spent during the visit, and time spent after the visit on documentation.    Raghu Wei MD  [facility]    Disclaimer: This document was created using voice recognition software (M*Modal Fluency Direct). Although it may be edited, this document may contain errors related to incorrect recognition of the spoken word. Please contact the physician if clarification is needed.

## 2024-07-26 NOTE — PROGRESS NOTES
Nutrition Assessment  Session Time:  60 minutes      Client name:  Fili Dejesus  :  2000  Age:  24 y.o.  Gender:  male    Client states:  Very pleasant [employer] Employee here today for annual [facility]  physical and nutrition assessment. He is single. He works in the field as a  for [employer]. He has been employed for 2 years. He reports to eating well and participating in daily exercise. 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 At times, he is unable to eat so he packs a lunch mostly Turkey Wrap w/ mueller, must, cheese. If he is too busy, he will skip a meal or eat out at a restaurant on his route. He reports he is unable to participate in the fitness portion of  due to his age. He voices a concern of needing to add medication for ADHD due to afternoon lows and limited focus. Discussed past labs as current labs are not resulted due to an internal issue.     Anthropometrics  Height:  71  inches     Weight: 184  lbs  BMI: 26.4      % Body Fat:  unable to assess    Clinical Signs/Symptoms  N/V/D:  None  Appetite:  good       Past Medical History:   Diagnosis Date    History of COVID-19     HSV-1 (herpes simplex virus 1) infection 2024    Tinea versicolor 2024       Past Surgical History:   Procedure Laterality Date    ADENOIDECTOMY      FRACTURE SURGERY      left arm ORIF Age 12    HERNIA REPAIR      inguinal repair at 10 months of age    TONSILLECTOMY         Medications    has a current medication list which includes the following prescription(s): ketoconazole.    Vitamins, Minerals, and/or Supplements:  None    Food/Medication Interactions:  Reviewed     Food Allergies or Intolerances:  NKFA    Social History    Marital status:  Single  Employment:  [employer]    Social History     Tobacco Use    Smoking status: Never    Smokeless tobacco: Never   Substance Use Topics    Alcohol use: Yes     Comment: socially        Lab Reports    Sodium   Date Value Ref Range Status   04/27/2024 141 136 - 145 mmol/L Final     Potassium   Date Value Ref Range Status   04/27/2024 4.5 3.5 - 5.1 mmol/L Final     Chloride   Date Value Ref Range Status   04/27/2024 105 95 - 110 mmol/L Final     CO2   Date Value Ref Range Status   04/27/2024 26 23 - 29 mmol/L Final     Glucose   Date Value Ref Range Status   04/27/2024 85 70 - 110 mg/dL Final     BUN   Date Value Ref Range Status   04/27/2024 12 6 - 20 mg/dL Final     Creatinine   Date Value Ref Range Status   04/27/2024 1.2 0.5 - 1.4 mg/dL Final     Calcium   Date Value Ref Range Status   04/27/2024 10.1 8.7 - 10.5 mg/dL Final     Total Protein   Date Value Ref Range Status   04/27/2024 7.5 6.0 - 8.4 g/dL Final     Albumin   Date Value Ref Range Status   04/27/2024 4.4 3.5 - 5.2 g/dL Final     Total Bilirubin   Date Value Ref Range Status   04/27/2024 0.4 0.1 - 1.0 mg/dL Final     Comment:     For infants and newborns, interpretation of results should be based  on gestational age, weight and in agreement with clinical  observations.    Premature Infant recommended reference ranges:  Up to 24 hours.............<8.0 mg/dL  Up to 48 hours............<12.0 mg/dL  3-5 days..................<15.0 mg/dL  6-29 days.................<15.0 mg/dL       Alkaline Phosphatase   Date Value Ref Range Status   04/27/2024 61 55 - 135 U/L Final     AST   Date Value Ref Range Status   04/27/2024 20 10 - 40 U/L Final     ALT   Date Value Ref Range Status   04/27/2024 21 10 - 44 U/L Final     Anion Gap   Date Value Ref Range Status   04/27/2024 10 8 - 16 mmol/L Final     eGFR if    Date Value Ref Range Status   10/01/2021 >60.0 >60 mL/min/1.73 m^2 Final     eGFR if non    Date Value Ref Range Status   10/01/2021 >60.0 >60 mL/min/1.73 m^2 Final     Comment:     Calculation used to obtain the estimated glomerular filtration  rate (eGFR) is the CKD-EPI equation.         Lab Results   Component Value Date     WBC 5.85 04/27/2024    HGB 15.4 04/27/2024    HCT 45.5 04/27/2024    MCV 94 04/27/2024     04/27/2024        Lab Results   Component Value Date    CHOL 180 04/27/2024     Lab Results   Component Value Date    HDL 70 04/27/2024     Lab Results   Component Value Date    LDLCALC 95.2 04/27/2024     Lab Results   Component Value Date    TRIG 74 04/27/2024     Lab Results   Component Value Date    CHOLHDL 38.9 04/27/2024     Lab Results   Component Value Date    HGBA1C 4.7 04/27/2024     BP Readings from Last 1 Encounters:   07/16/24 124/79       Food History  Meal Pattern: 1-2 meals daily and 1-2 snacks daily  Breakfast:  Banana or Apple w/ Coffee  Mid-morning Snack: none  Lunch:  Turkey Wrap w/ mueller, must, cheese or skips or eats out if too busy  Mid-afternoon Snack:  none  Dinner:  Sometimes skips if eats a late lunch;   Snack:  varies  *Fluid intake:  Drinks: Water 3-4 32oz containers or Beer (Occasionally)     Exercise History: Current exercise routine. 5x week 15-20 min Cardio and weight training.     Cultural/Spiritual/Personal Preferences:  None identified    Support System:  parents and friends    State of Change:  Contemplation      Barriers to Change:  None identified    Diagnosis    No nutrition diagnosis identified.      Intervention    RMR (Method:  [place] [address] [place]):  1840 kcal  Activity Factor:  1.3    SON:  2400 kcal    Goals:  1.  Continue healthy eating, hydration, and exercise routine.     Nutrition Education  The following education was provided to the patient:  Complimented patient on proactive role in health maintenance.  Complimented patient on dietary compliance/modifications and resulting health improvements.  Complimented patient on physical activity efforts.  Suggested dietary modifications based on current dietary behaviors and individual food preferences.  Discussed nutrition-related lab values and dietary and/or lifestyle factors affecting them.  *Lab results were pending at time  of consult and so, not discussed with patient.  Provided ongoing support, encouragement, and guidance toward improved health efforts.    Patient verbalized understanding of nutrition education and recommendations received.    Handouts Provided   Meal Planning Guide  Eat Fit Shopping List  Fueling Well On-The-Go    NCM Additional Handouts provided:  No additional information provided.      Monitoring/Evaluation    Monitor the following:  Weight  BMI  % Body Fat  Caloric intake  Labs:  Annual - CBC, CMP, A1C, LIPID PROFILE - HDL, LDL, TRIG, CHOL    Follow Up Plan:  Communication with referring healthcare provider is unnecessary at this time as patient presented as part of annual wellness exam.  However, will follow up with patient in 1-2 years.

## 2025-01-13 ENCOUNTER — CLINICAL SUPPORT (OUTPATIENT)
Dept: PSYCHIATRY | Facility: CLINIC | Age: 25
End: 2025-01-13
Payer: COMMERCIAL

## 2025-01-13 DIAGNOSIS — Z86.59 HISTORY OF ATTENTION DEFICIT HYPERACTIVITY DISORDER (ADHD): ICD-10-CM

## 2025-01-13 PROCEDURE — 99999 PR PBB SHADOW E&M-EST. PATIENT-LVL I: CPT | Mod: PBBFAC,,,

## 2025-01-13 PROCEDURE — 99211 OFF/OP EST MAY X REQ PHY/QHP: CPT | Mod: PBBFAC

## 2025-01-14 ENCOUNTER — PATIENT MESSAGE (OUTPATIENT)
Dept: PSYCHIATRY | Facility: CLINIC | Age: 25
End: 2025-01-14
Payer: COMMERCIAL

## 2025-01-29 ENCOUNTER — PATIENT MESSAGE (OUTPATIENT)
Dept: PSYCHIATRY | Facility: CLINIC | Age: 25
End: 2025-01-29
Payer: COMMERCIAL

## 2025-01-30 ENCOUNTER — TELEPHONE (OUTPATIENT)
Dept: PSYCHIATRY | Facility: CLINIC | Age: 25
End: 2025-01-30
Payer: COMMERCIAL

## 2025-01-30 ENCOUNTER — PATIENT MESSAGE (OUTPATIENT)
Dept: PSYCHIATRY | Facility: CLINIC | Age: 25
End: 2025-01-30
Payer: COMMERCIAL

## 2025-01-30 NOTE — TELEPHONE ENCOUNTER
Outreach to patient regarding ADHD pre-screen appointment (scheduled Thursday 1/30 at 1pm).    Patient reports forgetting about appointment due to busy work schedule. Requested to reschedule.    Intern will send [facility] message with contact information for rescheduling.

## 2025-03-11 ENCOUNTER — CLINICAL SUPPORT (OUTPATIENT)
Dept: PSYCHIATRY | Facility: CLINIC | Age: 25
End: 2025-03-11
Payer: COMMERCIAL

## 2025-03-11 ENCOUNTER — PATIENT MESSAGE (OUTPATIENT)
Dept: PSYCHIATRY | Facility: CLINIC | Age: 25
End: 2025-03-11
Payer: COMMERCIAL

## 2025-03-11 DIAGNOSIS — Z00.8 ENCOUNTER FOR PSYCHOLOGICAL EVALUATION: Primary | ICD-10-CM

## 2025-03-11 PROCEDURE — 99499 UNLISTED E&M SERVICE: CPT | Mod: 95,,,
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
## 2025-03-25 ENCOUNTER — OFFICE VISIT (OUTPATIENT)
Dept: PSYCHIATRY | Facility: CLINIC | Age: 25
End: 2025-03-25
Payer: COMMERCIAL

## 2025-03-25 VITALS
BODY MASS INDEX: 27.73 KG/M2 | HEART RATE: 80 BPM | DIASTOLIC BLOOD PRESSURE: 78 MMHG | WEIGHT: 193.25 LBS | SYSTOLIC BLOOD PRESSURE: 116 MMHG

## 2025-03-25 DIAGNOSIS — R41.840 CONCENTRATION DEFICIT: Primary | ICD-10-CM

## 2025-03-25 DIAGNOSIS — F41.9 ANXIETY DISORDER, UNSPECIFIED TYPE: ICD-10-CM

## 2025-03-25 PROCEDURE — 90792 PSYCH DIAG EVAL W/MED SRVCS: CPT | Mod: S$GLB,,, | Performed by: NURSE PRACTITIONER

## 2025-03-25 PROCEDURE — 99999 PR PBB SHADOW E&M-EST. PATIENT-LVL II: CPT | Mod: PBBFAC,,, | Performed by: NURSE PRACTITIONER

## 2025-03-25 PROCEDURE — 3074F SYST BP LT 130 MM HG: CPT | Mod: CPTII,S$GLB,, | Performed by: NURSE PRACTITIONER

## 2025-03-25 PROCEDURE — 3078F DIAST BP <80 MM HG: CPT | Mod: CPTII,S$GLB,, | Performed by: NURSE PRACTITIONER
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
## 2025-06-12 ENCOUNTER — OFFICE VISIT (OUTPATIENT)
Dept: PSYCHIATRY | Facility: CLINIC | Age: 25
End: 2025-06-12
Payer: COMMERCIAL

## 2025-06-12 VITALS
WEIGHT: 194.31 LBS | DIASTOLIC BLOOD PRESSURE: 79 MMHG | HEART RATE: 85 BPM | BODY MASS INDEX: 27.88 KG/M2 | SYSTOLIC BLOOD PRESSURE: 119 MMHG

## 2025-06-12 DIAGNOSIS — F90.0 ATTENTION DEFICIT HYPERACTIVITY DISORDER (ADHD), PREDOMINANTLY INATTENTIVE TYPE: Primary | ICD-10-CM

## 2025-06-12 DIAGNOSIS — F41.9 ANXIETY DISORDER, UNSPECIFIED TYPE: ICD-10-CM

## 2025-06-12 LAB
AMPHET UR QL SCN: NEGATIVE
BARBITURATE SCN PRESENT UR: NEGATIVE
BENZODIAZ UR QL SCN: NEGATIVE
CANNABINOIDS UR QL SCN: NEGATIVE
COCAINE UR QL SCN: NEGATIVE
CREAT UR-MCNC: 261 MG/DL (ref 23–375)
ETHANOL UR-MCNC: <10 MG/DL
METHADONE UR QL SCN: NEGATIVE
OPIATES UR QL SCN: NEGATIVE
PCP UR QL: NEGATIVE

## 2025-06-12 PROCEDURE — 3078F DIAST BP <80 MM HG: CPT | Mod: CPTII,S$GLB,, | Performed by: NURSE PRACTITIONER

## 2025-06-12 PROCEDURE — 3074F SYST BP LT 130 MM HG: CPT | Mod: CPTII,S$GLB,, | Performed by: NURSE PRACTITIONER

## 2025-06-12 PROCEDURE — 99999 PR PBB SHADOW E&M-EST. PATIENT-LVL II: CPT | Mod: PBBFAC,,, | Performed by: NURSE PRACTITIONER

## 2025-06-12 PROCEDURE — 99214 OFFICE O/P EST MOD 30 MIN: CPT | Mod: S$GLB,,, | Performed by: NURSE PRACTITIONER

## 2025-06-12 PROCEDURE — 80307 DRUG TEST PRSMV CHEM ANLYZR: CPT | Performed by: NURSE PRACTITIONER

## 2025-06-12 PROCEDURE — 3008F BODY MASS INDEX DOCD: CPT | Mod: CPTII,S$GLB,, | Performed by: NURSE PRACTITIONER

## 2025-06-12 NOTE — PROGRESS NOTES
"Outpatient Psychiatry Follow-Up Visit (MD/NP)    6/12/2025    Clinical Status of Patient:  Outpatient (Ambulatory)    Chief Complaint:  Fili Dejesus is a 25 y.o. male who presents today for follow-up of anxiety and attention problems.  Met with patient.      Interval History and Content of Current Session:    Social/medical updates -- no major social changes. Potential promotion to  role    "I don't think I was totally forthcoming on my day to day."    Mood -- presents with euthymic mood and affect. Denies persistent depressed mood, denies anhedonia. No thoughts of death or SI. No sha.   Anxiety -- no unregulated worry or panic   Attention -- clearer picture of inattentive sx today. 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 Takes extended periods of time to finish boring/tedious tasks that should be completed easily. Reports this was an issue with a previous job where he was behind a desk 40 hours weekly, quit after 8 months. Now has potential for promotion which would require more time behind a desk, concerned about ability to sustain attention.  Psychosis -- no  Sleep -- regulated  Energy -- adequate  Appetite -- adequate, weight stable, 194 lb today    Substance use -- nicotine sporadically. Occasional ETOH use, ~ 2x weekly socially.     Medications:    Start: Vyvanse 30 mg daily     Previous medication trials -- potential stimulant treatment briefly in adolescence     Screening tools:  06/12/25 -- PHQ-9 = 15  PERNELL-7 = 13  03/25/25 -- PHQ-9 =  8   PERNELL-7 = 7    Brief synopsis:  ADHD inattentive type, denies SI/HI/AVH      Review of Systems   PSYCHIATRIC: Pertinant items are noted in the narrative.  CONSTITUTIONAL: See above.   MUSCULOSKELETAL: No pain or stiffness of the joints.  NEUROLOGIC: No weakness, sensory changes, seizures, confusion, memory loss, tremor or other abnormal movements.  GASTROINTESTINAL: No nausea, " vomiting, pain, constipation or diarrhea.    Past Medical, Family and Social History: The patient's past medical, family and social history have been reviewed and updated as appropriate within the electronic medical record - see encounter notes.    Compliance: see above    Side effects: see above    Risk Parameters:  Patient reports no suicidal ideation  Patient reports no homicidal ideation  Patient reports no self-injurious behavior  Patient reports no violent behavior    Exam (detailed: at least 9 elements; comprehensive: all 15 elements)   Constitutional  Vitals:  Most recent vital signs, dated less than 90 days prior to this appointment, were reviewed.   Vitals:    06/12/25 0912   BP: 119/79   Pulse: 85   Weight: 88.2 kg (194 lb 5.4 oz)        General:  unremarkable, age appropriate     Musculoskeletal  Muscle Strength/Tone:  no spasicity, no rigidity   Gait & Station:  non-ataxic     Psychiatric  Speech:  no latency; no press   Mood & Affect:  euthymic  congruent and appropriate   Thought Process:  normal and logical   Associations:  intact   Thought Content:  normal, no suicidality, no homicidality, delusions, or paranoia   Insight:  intact   Judgement: behavior is adequate to circumstances   Orientation:  grossly intact   Memory: intact for content of interview   Language: grossly intact   Attention Span & Concentration:  able to focus   Fund of Knowledge:  intact and appropriate to age and level of education     Assessment and Diagnosis   Status/Progress: Based on the examination today, the patient's problem(s) is/are inadequately controlled.  New problems have been presented today.   Co-morbidities, Diagnostic uncertainty, and Lack of compliance are not complicating management of the primary condition.  There are no active rule-out diagnoses for this patient at this time.     General Impression: Fili Dejesus is a 25 y.o. male with a psychiatric hx of childhood diagnosed ADHD presenting with  symptoms consistent with ADHD, inattentive type. Medical hx is non-contributory. Family MH hx is significant for bipolar disorder in his mother. Past psychiatric treatment includes brief stimulant treatment between 7th-9th grade, stopped due to appetite supression. No hx of psychiatric hospitalizations. No hx of SA, SIB, or violence. No hx of substance abuse. Lives with roommate. 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     6/12/25 -- clearer picture of inattentive sx today, based on current and historical sx appears to meet criteria for ADHD inattentive type. Will start Vyvanse 30 mg daily pending UDS.         ICD-10-CM ICD-9-CM   1. Attention deficit hyperactivity disorder (ADHD), predominantly inattentive type  F90.0 314.00   2. Anxiety disorder, unspecified type  F41.9 300.00         Intervention/Counseling/Treatment Plan   Reviewed patient's current symptoms and medication regimen  Medication changes as above  Directed patient to complete UDS    Stimulants: discussed and educated the patient on risks of abuse, misuse, and habit forming risks with stimulant treatment. Informed patient that misuse of this drug may cause heart-related side effects or even sudden death. Potential risk of neurotoxicity was also discussed with prolonged use of high doses. The patient was also informed of possible adverse effects including but not limited to: headaches, increased HR and blood pressure, decreased appetite and weight loss, increased anxiety/nervousness, jitteriness, insomnia, dry mouth, agitation, exacerbation of sha, and in rare cases psychosis.  Patient made aware that an EKG will be ordered prior to administration with any history of cardiac disease. Potential increased risk for hypertension, arterial disease, stroke, severe cardiac events and death. The patient expressed understanding. Alternatives to this medication were also discussed with the patient, including risks of not taking medications, and the  patient was agreeable to the above choice.    Medication Management  Prescription drug management was employed during the encounter, as medications were prescribed, or considered but not prescribed.   [facility] reviewed  The risks and benefits of medication were discussed with the patient.  Possible expectable adverse effects of any current or proposed individual psychotropic agents were discussed with this patient.  Counseling was provided on the importance of full compliance with any prescribed medication.  Detailed instructions were provided to the patient regarding the administration of any prescribed medication.  The most common and rare side effects were reviewed, including discussion of potential permanent movement disorder and disfiguring conditions if applicable to any prescribed medication  Patient voiced understanding    Return to Clinic: 1-3 months    I spent a total of 33 minutes on the day of the visit. This includes face to face time and non-face to face time preparing to see the patient (eg, review of tests), obtaining and/or reviewing separately obtained history, documenting clinical information in the electronic or other health record, independently interpreting results and communicating results to the patient/family/caregiver, or care coordinator.

## 2025-06-18 ENCOUNTER — TELEPHONE (OUTPATIENT)
Dept: PSYCHIATRY | Facility: CLINIC | Age: 25
End: 2025-06-18
Payer: COMMERCIAL

## 2025-06-18 DIAGNOSIS — Z00.00 ROUTINE MEDICAL EXAM: Primary | ICD-10-CM

## 2025-06-18 DIAGNOSIS — F90.0 ATTENTION DEFICIT HYPERACTIVITY DISORDER (ADHD), PREDOMINANTLY INATTENTIVE TYPE: Primary | ICD-10-CM

## 2025-06-18 RX ORDER — LISDEXAMFETAMINE DIMESYLATE 30 MG/1
30 CAPSULE ORAL EVERY MORNING
Qty: 30 CAPSULE | Refills: 0 | Status: SHIPPED | OUTPATIENT
Start: 2025-06-18 | End: 2025-07-18

## 2025-06-18 RX ORDER — LISDEXAMFETAMINE DIMESYLATE 30 MG/1
30 CAPSULE ORAL EVERY MORNING
Qty: 30 CAPSULE | Refills: 0 | Status: SHIPPED | OUTPATIENT
Start: 2025-08-12 | End: 2025-09-11

## 2025-06-18 RX ORDER — LISDEXAMFETAMINE DIMESYLATE 30 MG/1
30 CAPSULE ORAL EVERY MORNING
Qty: 30 CAPSULE | Refills: 0 | Status: SHIPPED | OUTPATIENT
Start: 2025-07-16 | End: 2025-08-15

## 2025-06-18 NOTE — TELEPHONE ENCOUNTER
----- Message from HUGH Farris sent at 6/18/2025  2:40 PM CDT -----  Regarding: FW: Medication Question  Contact: Fili Dejesus [phone]    ----- Message -----  From: Hermanunden, April  Sent: 6/18/2025   1:10 PM CDT  To: [facility] Staff  Subject: Medication Question                              Pt. requested a call back about starting RX Vyvanse. He stated he has completed screening and would like to see when he is able to start medication.     Thanks.

## 2025-06-26 ENCOUNTER — OFFICE VISIT (OUTPATIENT)
Dept: INTERNAL MEDICINE | Facility: CLINIC | Age: 25
End: 2025-06-26
Payer: COMMERCIAL

## 2025-06-26 ENCOUNTER — HOSPITAL ENCOUNTER (OUTPATIENT)
Dept: CARDIOLOGY | Facility: CLINIC | Age: 25
Discharge: HOME | End: 2025-06-26
Payer: COMMERCIAL

## 2025-06-26 ENCOUNTER — CLINICAL SUPPORT (OUTPATIENT)
Dept: INTERNAL MEDICINE | Facility: CLINIC | Age: 25
End: 2025-06-26
Payer: COMMERCIAL

## 2025-06-26 ENCOUNTER — CLINICAL SUPPORT (OUTPATIENT)
Dept: INTERNAL MEDICINE | Facility: CLINIC | Age: 25
End: 2025-06-26

## 2025-06-26 VITALS
BODY MASS INDEX: 28.12 KG/M2 | HEART RATE: 87 BPM | HEIGHT: 70 IN | DIASTOLIC BLOOD PRESSURE: 83 MMHG | WEIGHT: 196.44 LBS | SYSTOLIC BLOOD PRESSURE: 129 MMHG

## 2025-06-26 DIAGNOSIS — F90.0 ATTENTION DEFICIT HYPERACTIVITY DISORDER (ADHD), PREDOMINANTLY INATTENTIVE TYPE: ICD-10-CM

## 2025-06-26 DIAGNOSIS — Z00.00 HEALTHCARE MAINTENANCE: ICD-10-CM

## 2025-06-26 DIAGNOSIS — Z00.00 ROUTINE MEDICAL EXAM: ICD-10-CM

## 2025-06-26 DIAGNOSIS — Z00.00 ROUTINE GENERAL MEDICAL EXAMINATION AT A HEALTH CARE FACILITY: Primary | ICD-10-CM

## 2025-06-26 DIAGNOSIS — E66.3 OVERWEIGHT WITH BODY MASS INDEX (BMI) OF 28 TO 28.9 IN ADULT: ICD-10-CM

## 2025-06-26 LAB
ALBUMIN SERPL BCP-MCNC: 4.9 G/DL (ref 3.5–5.2)
ALP SERPL-CCNC: 73 UNIT/L (ref 40–150)
ALT SERPL W/O P-5'-P-CCNC: 23 UNIT/L (ref 10–44)
ANION GAP (OHS): 13 MMOL/L (ref 8–16)
AST SERPL-CCNC: 17 UNIT/L (ref 11–45)
BILIRUB SERPL-MCNC: 0.7 MG/DL (ref 0.1–1)
BUN SERPL-MCNC: 15 MG/DL (ref 6–20)
CALCIUM SERPL-MCNC: 9.7 MG/DL (ref 8.7–10.5)
CHLORIDE SERPL-SCNC: 104 MMOL/L (ref 95–110)
CHOLEST SERPL-MCNC: 216 MG/DL (ref 120–199)
CHOLEST/HDLC SERPL: 3.3 {RATIO} (ref 2–5)
CO2 SERPL-SCNC: 28 MMOL/L (ref 23–29)
CREAT SERPL-MCNC: 1.3 MG/DL (ref 0.5–1.4)
EAG (OHS): 88 MG/DL (ref 68–131)
ERYTHROCYTE [DISTWIDTH] IN BLOOD BY AUTOMATED COUNT: 12.1 % (ref 11.5–14.5)
GFR SERPLBLD CREATININE-BSD FMLA CKD-EPI: >60 ML/MIN/1.73/M2
GLUCOSE SERPL-MCNC: 92 MG/DL (ref 70–110)
HBA1C MFR BLD: 4.7 % (ref 4–5.6)
HCT VFR BLD AUTO: 47.4 % (ref 40–54)
HDLC SERPL-MCNC: 65 MG/DL (ref 40–75)
HDLC SERPL: 30.1 % (ref 20–50)
HGB BLD-MCNC: 15.7 GM/DL (ref 14–18)
LDLC SERPL CALC-MCNC: 127.4 MG/DL (ref 63–159)
MCH RBC QN AUTO: 30.9 PG (ref 27–31)
MCHC RBC AUTO-ENTMCNC: 33.1 G/DL (ref 32–36)
MCV RBC AUTO: 93 FL (ref 82–98)
NONHDLC SERPL-MCNC: 151 MG/DL
OHS QRS DURATION: 88 MS
OHS QTC CALCULATION: 392 MS
PLATELET # BLD AUTO: 250 K/UL (ref 150–450)
PMV BLD AUTO: 10.2 FL (ref 9.2–12.9)
POTASSIUM SERPL-SCNC: 4.1 MMOL/L (ref 3.5–5.1)
PROT SERPL-MCNC: 8.1 GM/DL (ref 6–8.4)
RBC # BLD AUTO: 5.08 M/UL (ref 4.6–6.2)
SODIUM SERPL-SCNC: 145 MMOL/L (ref 136–145)
TRIGL SERPL-MCNC: 118 MG/DL (ref 30–150)
WBC # BLD AUTO: 6.1 K/UL (ref 3.9–12.7)

## 2025-06-26 PROCEDURE — 99999 PR PBB SHADOW E&M-EST. PATIENT-LVL I: CPT | Mod: PBBFAC,,,

## 2025-06-26 PROCEDURE — 80061 LIPID PANEL: CPT

## 2025-06-26 PROCEDURE — 99999 PR PBB SHADOW E&M-EST. PATIENT-LVL IV: CPT | Mod: PBBFAC,,, | Performed by: STUDENT IN AN ORGANIZED HEALTH CARE EDUCATION/TRAINING PROGRAM

## 2025-06-26 PROCEDURE — 83036 HEMOGLOBIN GLYCOSYLATED A1C: CPT

## 2025-06-26 PROCEDURE — 99199 UNLISTED SPECIAL SVC PX/RPRT: CPT | Mod: ,,, | Performed by: INTERNAL MEDICINE

## 2025-06-26 PROCEDURE — 80053 COMPREHEN METABOLIC PANEL: CPT

## 2025-06-26 PROCEDURE — 85027 COMPLETE CBC AUTOMATED: CPT

## 2025-06-26 NOTE — PROGRESS NOTES
Subjective:       Patient ID: Fili Dejesus is a 25 y.o. male.    Chief Complaint: [employer] [facility]    HPI    Fili Dejesus is a 25 y.o. male , English speaking, with a history of:  H/o tinea versicolor  H/o COVID (mild) x 1  2020  ADHD    [Local Patient]  Originally from [facility]  Lives in: [place] [address]       Patient comes to the clinic a general medical health examination through [facility].    Patient is currently asymptomatic and has no complaints.    He has a history of ADD/ADHD initially diagnosed at age 12-13 and recently re-diagnosed as an adult due to focus-related work difficulties. He recently initiated Vyvanse this week, which is being tolerated well without any adverse side effects.    Patient denies CV symptoms, CP, SOB, palpitations.  Patient denies constitutional symptoms, fever, changes in the urine or stool.    He has a history of oral herpes infection that is asymptomatic and doesn't have recurrent flares. He experiences recurrent seasonal fungal skin infection on his back managed with prescribed topical treatment. He had COVID-19 in 2020 with temporary loss of taste and smell for approximately one week.    He has a history of three surgical procedures: surgery for a broken arm, tonsillectomy, and hernia repair during childhood.    He denies tobacco use. He uses THC occasionally in social settings. He consumes 4-5 alcoholic drinks per week, socially related to work. He attends the gym 3-4 times per week, engaging in weight lifting and cardio.    Paternal grandfather had prostate cancer. Maternal history includes mental illness with bipolar disorder, depression, and alcohol use. He denies family history of heart disease or paternal mental health concerns.    No other concerns      Past Medical History:   Diagnosis Date    ADHD     History of COVID-19 2020    HSV-1 (herpes simplex virus 1) infection 04/29/2024    Tinea versicolor 04/27/2024       Past Surgical History:    Procedure Laterality Date    ADENOIDECTOMY      FRACTURE SURGERY      left arm ORIF Age 12    HERNIA REPAIR      inguinal repair at 10 months of age    TONSILLECTOMY         Family History   Problem Relation Name Age of Onset    Alcohol abuse Mother Ciara     Depression Mother Ciara     Drug abuse Mother Ciara     Bipolar disorder Mother Ciara     No Known Problems Father      Prostate cancer Paternal Grandfather Dario        Allergies:   Review of patient's allergies indicates:  No Known Allergies    Current Medications[1]    Social history:  Single, no children  Lives with a roommate  Works for [employer] as     Exercise:   Sedentary    Diet:   Regular with no restrictions    Tobacco use: Denies    Tobacco Use: Medium Risk (6/26/2025)    Patient History     Smoking Tobacco Use: Former     Smokeless Tobacco Use: Never     Passive Exposure: Not on file        Alcohol use: Denies    Recreational drug use: Denies    Recent travel: Denies      Most recent laboratories reviewed:    Recent Labs   Lab 04/27/24  0910 07/26/24  0945 06/26/25  1004   WBC 5.85 4.61 6.10   Hemoglobin 15.4 15.4  --    HGB  --   --  15.7   Hematocrit 45.5 44.0  --    HCT  --   --  47.4   MCV 94 93 93   Platelet Count  --   --  250   Platelets 279 252  --        Recent Labs   Lab 04/27/24  0910 07/26/24  0945 06/26/25  1004   Glucose 85 87 92   Sodium 141 141 145   Potassium 4.5 4.3 4.1   BUN 12 19 15   Creatinine 1.2 1.4 1.3   Total Bilirubin 0.4 0.9  --    Bilirubin Total  --   --  0.7   AST 20 19 17   ALT 21 22 23       Recent Labs   Lab 04/27/24  0910 07/26/24  0945 06/26/25  1004   Hemoglobin A1C 4.7 4.6  --    Hemoglobin A1c  --   --  4.7       Recent Labs   Lab 04/27/24  0910 07/26/24  0945 06/26/25  1004   Cholesterol Total  --   --  216 H   Cholesterol 180 194  --    Triglycerides 74 55  --    Triglyceride  --   --  118   HDL 70 70  --    HDL Cholesterol  --   --  65   LDL Cholesterol 95.2 113.0 127.4   Non-HDL  "Cholesterol 110 124  --    Non HDL Cholesterol  --   --  151       Recent Labs   Lab 04/27/24  0910   TSH 1.934       Recent Labs   Lab 04/27/24  0910   HIV 1/2 Ag/Ab Non-reactive         Latest ECG results:  Results for orders placed or performed during the hospital encounter of 07/26/24   EKG 12-lead    Collection Time: 07/26/24 10:03 AM   Result Value Ref Range    QRS Duration 88 ms    OHS QTC Calculation 384 ms    Narrative    Test Reason : Z00.00,    Vent. Rate : 070 BPM     Atrial Rate : 070 BPM     P-R Int : 142 ms          QRS Dur : 088 ms      QT Int : 356 ms       P-R-T Axes : 065 023 037 degrees     QTc Int : 384 ms    Normal sinus rhythm  Normal ECG  No previous ECGs available  Confirmed by Trinity Terry MD ([id number]) on 7/26/2024 12:20:22 PM    Referred By: SHERLY HARVEY           Confirmed By:Trinity Terry MD       Healthcare Maintenance:  Colon cancer screening:       Vaccinations:        Tetanus - 2024       Shingles - N/A       Influenza - N/A       Pneumonia - N/A       COVID - completed X 2    Depression screening: PHQ2 score = 0    Annual visit approx. Month: JUNE ROS  11-point review of systems done. Negative except for detailed in the HPI.          Objective:      /83 (Patient Position: Sitting)   Pulse 87   Ht 5' 10" (1.778 m)   Wt 89.1 kg (196 lb 6.9 oz)   BMI 28.18 kg/m²      Physical Exam   General appearance: Good general aspect, NAD, conversant   Eyes and HEENT: Normal sclerae, moist mucous membranes, no thyromegaly or lymphadenopathy  Respiratory: No work of breathing, clear to auscultation bilaterally. No rales, rhonchi, wheezing, or rubs.  Cardiovascular: PMI not displaced. RRR. Normal S1, S2. No murmurs, rubs or gallops.  Abdomen and : Soft, non-tender, nondistended, BS, no hepatosplenomegaly, no masses.  Extremities: no edemas, no extremity lymphadenopathy, no clubbing, no cyanosis.  Nervous System: Alert and oriented x 3, good concentration, no deficits.  Skin: Normal " temperature, turgor and texture; no rash, ulcers or subcutaneous nodules  Psych: Appropriate affect, alert and oriented to person, place and time          Assessment:       1. Routine general medical examination at a health care facility  Assessment & Plan:  Patient is in overall good general health.  Stable medical conditions listed on the PMH.  Labs reviewed and patient notified.        2. Attention deficit hyperactivity disorder (ADHD), predominantly inattentive type  Assessment & Plan:  Managed by psychiatry  On Vyvanse  Continue      3. Overweight with body mass index (BMI) of 28 to 28.9 in adult  Assessment & Plan:  Today's weight: 89.1 kg (196 lb 6.9 oz)  Today's BMI: Body mass index is 28.18 kg/m².  Wt Readings from Last 3 Encounters:   06/26/25 89.1 kg (196 lb 6.9 oz)   06/12/25 88.2 kg (194 lb 5.4 oz)   03/25/25 87.6 kg (193 lb 3.7 oz)     Continue lifestyle modification  Continue weight loss        4. Healthcare maintenance  Assessment & Plan:  Health care maintenance and core gaps reviewed and assessed with patient.    Vaccinations recommended:        Tetanus - 2024       Shingles - n/a       Influenza - n/a       Pneumonia - n/a    Colon cancer screening:   n/a       Lifestyle recommendations given.  Physical activity recommended.    Legend: Ordered (O), Declined (D), Current (C)           Plan:         IMPRESSION:   Conducted annual exam for new patient, 25-year-old male. Reviewed medical history, including previous COVID-19 infection, broken arm, and tonsillectomy. Appears healthy and strong. Up-to-date on vaccinations. Recently initiated Vyvanse for ADD/ADHD. Occasional social alcohol and THC use. Family history includes grandfather's prostate cancer and mother's mental health history.     PLAN SUMMARY:   Continue Vyvanse for ADHD management   Advised mindfulness about alcohol consumption due to occupation   Educated about recommended limits and potential health impacts of cannabis use  Healthy diet  recommended, avoid fried / fatty food.    I will assume as PCP.          Patient Instructions   Stay physically active         Problem list updated  Medications reconciled  Education provided  Lifestyle recommendations given  AVS generated, explained, and sent to the patient.  RTC in : 1 year for annual wellness visit, labs not ordered yet          BHAVIN PRETTY MD, MPH  Internal Medicine  [facility]             [1]   Current Outpatient Medications:     lisdexamfetamine (VYVANSE) 30 MG capsule, Take 1 capsule (30 mg total) by mouth every morning., Disp: 30 capsule, Rfl: 0    [START ON 7/16/2025] lisdexamfetamine (VYVANSE) 30 MG capsule, Take 1 capsule (30 mg total) by mouth every morning., Disp: 30 capsule, Rfl: 0    [START ON 8/12/2025] lisdexamfetamine (VYVANSE) 30 MG capsule, Take 1 capsule (30 mg total) by mouth every morning., Disp: 30 capsule, Rfl: 0    ketoconazole (NIZORAL) 2 % shampoo, Apply topically twice a week. (Patient not taking: Reported on 6/26/2025), Disp: 120 mL, Rfl: 11

## 2025-06-26 NOTE — ASSESSMENT & PLAN NOTE
Today's weight: 89.1 kg (196 lb 6.9 oz)  Today's BMI: Body mass index is 28.18 kg/m².  Wt Readings from Last 3 Encounters:   06/26/25 89.1 kg (196 lb 6.9 oz)   06/12/25 88.2 kg (194 lb 5.4 oz)   03/25/25 87.6 kg (193 lb 3.7 oz)     Continue lifestyle modification  Continue weight loss

## 2025-06-26 NOTE — ASSESSMENT & PLAN NOTE
Health care maintenance and core gaps reviewed and assessed with patient.    Vaccinations recommended:        Tetanus - 2024       Shingles - n/a       Influenza - n/a       Pneumonia - n/a    Colon cancer screening:   n/a       Lifestyle recommendations given.  Physical activity recommended.    Legend: Ordered (O), Declined (D), Current (C)

## 2025-07-03 ENCOUNTER — PATIENT MESSAGE (OUTPATIENT)
Dept: PSYCHIATRY | Facility: CLINIC | Age: 25
End: 2025-07-03
Payer: COMMERCIAL

## 2025-07-16 ENCOUNTER — PATIENT MESSAGE (OUTPATIENT)
Dept: PSYCHIATRY | Facility: CLINIC | Age: 25
End: 2025-07-16
Payer: COMMERCIAL

## 2025-08-04 DIAGNOSIS — F90.0 ATTENTION DEFICIT HYPERACTIVITY DISORDER (ADHD), PREDOMINANTLY INATTENTIVE TYPE: ICD-10-CM

## 2025-08-04 RX ORDER — LISDEXAMFETAMINE DIMESYLATE 30 MG/1
30 CAPSULE ORAL EVERY MORNING
Qty: 30 CAPSULE | Refills: 0 | Status: SHIPPED | OUTPATIENT
Start: 2025-08-04 | End: 2025-09-03

## 2025-09-04 DIAGNOSIS — F90.0 ATTENTION DEFICIT HYPERACTIVITY DISORDER (ADHD), PREDOMINANTLY INATTENTIVE TYPE: ICD-10-CM

## 2025-09-05 RX ORDER — LISDEXAMFETAMINE DIMESYLATE 30 MG/1
30 CAPSULE ORAL EVERY MORNING
Qty: 30 CAPSULE | Refills: 0 | Status: SHIPPED | OUTPATIENT
Start: 2025-09-05 | End: 2025-10-05